# Patient Record
Sex: MALE | Race: WHITE | Employment: FULL TIME | URBAN - NONMETROPOLITAN AREA
[De-identification: names, ages, dates, MRNs, and addresses within clinical notes are randomized per-mention and may not be internally consistent; named-entity substitution may affect disease eponyms.]

---

## 2017-06-14 ENCOUNTER — OFFICE VISIT (OUTPATIENT)
Dept: PRIMARY CARE CLINIC | Age: 52
End: 2017-06-14
Payer: COMMERCIAL

## 2017-06-14 VITALS
BODY MASS INDEX: 29.4 KG/M2 | HEART RATE: 72 BPM | WEIGHT: 194 LBS | DIASTOLIC BLOOD PRESSURE: 72 MMHG | RESPIRATION RATE: 18 BRPM | SYSTOLIC BLOOD PRESSURE: 133 MMHG | TEMPERATURE: 97 F | HEIGHT: 68 IN

## 2017-06-14 DIAGNOSIS — E78.5 HYPERLIPIDEMIA, UNSPECIFIED HYPERLIPIDEMIA TYPE: ICD-10-CM

## 2017-06-14 DIAGNOSIS — I25.10 CORONARY ARTERY DISEASE INVOLVING NATIVE CORONARY ARTERY OF NATIVE HEART WITHOUT ANGINA PECTORIS: ICD-10-CM

## 2017-06-14 DIAGNOSIS — G44.52 NEW DAILY PERSISTENT HEADACHE: Primary | ICD-10-CM

## 2017-06-14 PROCEDURE — 99213 OFFICE O/P EST LOW 20 MIN: CPT | Performed by: FAMILY MEDICINE

## 2017-06-19 ASSESSMENT — ENCOUNTER SYMPTOMS
COLOR CHANGE: 0
NAUSEA: 0
DIARRHEA: 0
ABDOMINAL PAIN: 0
VOMITING: 0
RHINORRHEA: 0
CONSTIPATION: 0
COUGH: 0

## 2017-08-07 ENCOUNTER — OFFICE VISIT (OUTPATIENT)
Dept: PRIMARY CARE CLINIC | Age: 52
End: 2017-08-07
Payer: COMMERCIAL

## 2017-08-07 VITALS
HEIGHT: 68 IN | WEIGHT: 193 LBS | TEMPERATURE: 96.8 F | DIASTOLIC BLOOD PRESSURE: 80 MMHG | RESPIRATION RATE: 16 BRPM | SYSTOLIC BLOOD PRESSURE: 130 MMHG | BODY MASS INDEX: 29.25 KG/M2 | HEART RATE: 79 BPM

## 2017-08-07 DIAGNOSIS — M54.42 ACUTE BILATERAL LOW BACK PAIN WITH BILATERAL SCIATICA: ICD-10-CM

## 2017-08-07 DIAGNOSIS — Z12.11 SCREENING FOR COLON CANCER: ICD-10-CM

## 2017-08-07 DIAGNOSIS — E78.5 HYPERLIPIDEMIA, UNSPECIFIED HYPERLIPIDEMIA TYPE: ICD-10-CM

## 2017-08-07 DIAGNOSIS — M54.41 ACUTE BILATERAL LOW BACK PAIN WITH BILATERAL SCIATICA: ICD-10-CM

## 2017-08-07 DIAGNOSIS — R42 DIZZY: Primary | ICD-10-CM

## 2017-08-07 DIAGNOSIS — Z86.79 HISTORY OF ATHEROSCLEROTIC CARDIOVASCULAR DISEASE: ICD-10-CM

## 2017-08-07 PROCEDURE — 36415 COLL VENOUS BLD VENIPUNCTURE: CPT | Performed by: NURSE PRACTITIONER

## 2017-08-07 PROCEDURE — 93000 ELECTROCARDIOGRAM COMPLETE: CPT | Performed by: NURSE PRACTITIONER

## 2017-08-07 PROCEDURE — 99214 OFFICE O/P EST MOD 30 MIN: CPT | Performed by: NURSE PRACTITIONER

## 2017-08-07 PROCEDURE — 96372 THER/PROPH/DIAG INJ SC/IM: CPT | Performed by: NURSE PRACTITIONER

## 2017-08-07 RX ORDER — PREDNISONE 10 MG/1
TABLET ORAL
Qty: 18 TABLET | Refills: 0 | Status: ON HOLD | OUTPATIENT
Start: 2017-08-07 | End: 2017-12-04 | Stop reason: ALTCHOICE

## 2017-08-07 RX ORDER — METOPROLOL SUCCINATE 50 MG
TABLET, EXTENDED RELEASE 24 HR ORAL
COMMUNITY
Start: 2017-07-26 | End: 2018-03-29 | Stop reason: CLARIF

## 2017-08-07 RX ORDER — CYCLOBENZAPRINE HCL 10 MG
10 TABLET ORAL 3 TIMES DAILY PRN
Qty: 30 TABLET | Refills: 0 | Status: SHIPPED | OUTPATIENT
Start: 2017-08-07 | End: 2017-08-17

## 2017-08-07 RX ORDER — TRIAMCINOLONE ACETONIDE 40 MG/ML
40 INJECTION, SUSPENSION INTRA-ARTICULAR; INTRAMUSCULAR ONCE
Status: COMPLETED | OUTPATIENT
Start: 2017-08-07 | End: 2017-08-07

## 2017-08-07 RX ORDER — KETOROLAC TROMETHAMINE 10 MG/1
10 TABLET, FILM COATED ORAL EVERY 6 HOURS PRN
Qty: 20 TABLET | Refills: 0 | Status: ON HOLD | OUTPATIENT
Start: 2017-08-07 | End: 2017-12-04 | Stop reason: ALTCHOICE

## 2017-08-07 RX ORDER — ATORVASTATIN CALCIUM 80 MG/1
TABLET, FILM COATED ORAL
COMMUNITY
Start: 2017-05-16 | End: 2020-08-20

## 2017-08-07 RX ADMIN — TRIAMCINOLONE ACETONIDE 40 MG: 40 INJECTION, SUSPENSION INTRA-ARTICULAR; INTRAMUSCULAR at 17:26

## 2017-08-07 ASSESSMENT — ENCOUNTER SYMPTOMS
RESPIRATORY NEGATIVE: 1
GASTROINTESTINAL NEGATIVE: 1
BACK PAIN: 1
EYES NEGATIVE: 1

## 2017-08-08 LAB
ALBUMIN SERPL-MCNC: 4.7 G/DL (ref 3.5–5.2)
ALP BLD-CCNC: 169 U/L (ref 40–130)
ALT SERPL-CCNC: 52 U/L (ref 5–41)
ANION GAP SERPL CALCULATED.3IONS-SCNC: 13 MMOL/L (ref 7–19)
AST SERPL-CCNC: 28 U/L (ref 5–40)
BILIRUB SERPL-MCNC: 1.4 MG/DL (ref 0.2–1.2)
BUN BLDV-MCNC: 11 MG/DL (ref 6–20)
CALCIUM SERPL-MCNC: 9.7 MG/DL (ref 8.6–10)
CHLORIDE BLD-SCNC: 99 MMOL/L (ref 98–111)
CO2: 27 MMOL/L (ref 22–29)
CREAT SERPL-MCNC: 0.8 MG/DL (ref 0.5–1.2)
GFR NON-AFRICAN AMERICAN: >60
GLUCOSE BLD-MCNC: 125 MG/DL (ref 74–109)
HCT VFR BLD CALC: 46.2 % (ref 42–52)
HEMOGLOBIN: 15.8 G/DL (ref 14–18)
MCH RBC QN AUTO: 29.7 PG (ref 27–31)
MCHC RBC AUTO-ENTMCNC: 34.2 G/DL (ref 33–37)
MCV RBC AUTO: 86.8 FL (ref 80–94)
PDW BLD-RTO: 13.6 % (ref 11.5–14.5)
PLATELET # BLD: 211 K/UL (ref 130–400)
PMV BLD AUTO: 10.9 FL (ref 9.4–12.4)
POTASSIUM SERPL-SCNC: 4.4 MMOL/L (ref 3.5–5)
RBC # BLD: 5.32 M/UL (ref 4.7–6.1)
SODIUM BLD-SCNC: 139 MMOL/L (ref 136–145)
TOTAL CK: 45 U/L (ref 39–308)
TOTAL PROTEIN: 7.8 G/DL (ref 6.6–8.7)
WBC # BLD: 8.5 K/UL (ref 4.8–10.8)

## 2017-08-09 ENCOUNTER — HOSPITAL ENCOUNTER (OUTPATIENT)
Dept: VASCULAR LAB | Age: 52
Discharge: HOME | End: 2017-08-09
Payer: COMMERCIAL

## 2017-08-09 DIAGNOSIS — R42 DIZZY: Primary | ICD-10-CM

## 2017-08-09 PROCEDURE — 93880 EXTRACRANIAL BILAT STUDY: CPT

## 2017-08-16 ENCOUNTER — OFFICE VISIT (OUTPATIENT)
Dept: GASTROENTEROLOGY | Age: 52
End: 2017-08-16
Payer: COMMERCIAL

## 2017-08-16 VITALS
OXYGEN SATURATION: 98 % | HEIGHT: 68 IN | WEIGHT: 188 LBS | BODY MASS INDEX: 28.49 KG/M2 | RESPIRATION RATE: 18 BRPM | DIASTOLIC BLOOD PRESSURE: 80 MMHG | HEART RATE: 85 BPM | SYSTOLIC BLOOD PRESSURE: 105 MMHG

## 2017-08-16 DIAGNOSIS — Z83.71 FAMILY HISTORY OF COLONIC POLYPS: ICD-10-CM

## 2017-08-16 DIAGNOSIS — Z12.11 ENCOUNTER FOR SCREENING COLONOSCOPY: Primary | ICD-10-CM

## 2017-08-16 DIAGNOSIS — R12 CHRONIC HEARTBURN: ICD-10-CM

## 2017-08-16 DIAGNOSIS — Z80.0 FAMILY HISTORY OF COLON CANCER: ICD-10-CM

## 2017-08-16 PROCEDURE — 99214 OFFICE O/P EST MOD 30 MIN: CPT | Performed by: NURSE PRACTITIONER

## 2017-08-16 ASSESSMENT — ENCOUNTER SYMPTOMS
NAUSEA: 0
DIARRHEA: 0
ABDOMINAL DISTENTION: 0
CHEST TIGHTNESS: 0
BACK PAIN: 0
CONSTIPATION: 0
VOMITING: 0
SHORTNESS OF BREATH: 0
ABDOMINAL PAIN: 0
VOICE CHANGE: 0
RECTAL PAIN: 0
COUGH: 0
SORE THROAT: 0
BLOOD IN STOOL: 0

## 2017-08-22 ENCOUNTER — TELEPHONE (OUTPATIENT)
Dept: GASTROENTEROLOGY | Age: 52
End: 2017-08-22

## 2017-12-04 ENCOUNTER — ANESTHESIA (OUTPATIENT)
Dept: ENDOSCOPY | Age: 52
End: 2017-12-04
Payer: COMMERCIAL

## 2017-12-04 ENCOUNTER — ANESTHESIA EVENT (OUTPATIENT)
Dept: ENDOSCOPY | Age: 52
End: 2017-12-04
Payer: COMMERCIAL

## 2017-12-04 ENCOUNTER — HOSPITAL ENCOUNTER (OUTPATIENT)
Age: 52
Setting detail: OUTPATIENT SURGERY
Discharge: HOME | End: 2017-12-04
Attending: INTERNAL MEDICINE | Admitting: INTERNAL MEDICINE
Payer: COMMERCIAL

## 2017-12-04 VITALS
HEIGHT: 68 IN | OXYGEN SATURATION: 97 % | DIASTOLIC BLOOD PRESSURE: 81 MMHG | RESPIRATION RATE: 14 BRPM | SYSTOLIC BLOOD PRESSURE: 115 MMHG | WEIGHT: 185 LBS | BODY MASS INDEX: 28.04 KG/M2 | HEART RATE: 72 BPM | TEMPERATURE: 97.1 F

## 2017-12-04 VITALS
RESPIRATION RATE: 13 BRPM | OXYGEN SATURATION: 95 % | DIASTOLIC BLOOD PRESSURE: 67 MMHG | SYSTOLIC BLOOD PRESSURE: 106 MMHG

## 2017-12-04 PROCEDURE — 43239 EGD BIOPSY SINGLE/MULTIPLE: CPT | Performed by: INTERNAL MEDICINE

## 2017-12-04 PROCEDURE — 7100000011 HC PHASE II RECOVERY - ADDTL 15 MIN: Performed by: INTERNAL MEDICINE

## 2017-12-04 PROCEDURE — 7100000010 HC PHASE II RECOVERY - FIRST 15 MIN: Performed by: INTERNAL MEDICINE

## 2017-12-04 PROCEDURE — 88305 TISSUE EXAM BY PATHOLOGIST: CPT

## 2017-12-04 PROCEDURE — 6360000002 HC RX W HCPCS: Performed by: NURSE ANESTHETIST, CERTIFIED REGISTERED

## 2017-12-04 PROCEDURE — 3609010300 HC COLONOSCOPY W/BIOPSY SINGLE/MULTIPLE: Performed by: INTERNAL MEDICINE

## 2017-12-04 PROCEDURE — 3700000000 HC ANESTHESIA ATTENDED CARE: Performed by: INTERNAL MEDICINE

## 2017-12-04 PROCEDURE — 2580000003 HC RX 258: Performed by: INTERNAL MEDICINE

## 2017-12-04 PROCEDURE — G0105 COLORECTAL SCRN; HI RISK IND: HCPCS | Performed by: INTERNAL MEDICINE

## 2017-12-04 PROCEDURE — 2500000003 HC RX 250 WO HCPCS: Performed by: NURSE ANESTHETIST, CERTIFIED REGISTERED

## 2017-12-04 PROCEDURE — 3609012400 HC EGD TRANSORAL BIOPSY SINGLE/MULTIPLE: Performed by: INTERNAL MEDICINE

## 2017-12-04 PROCEDURE — 3700000001 HC ADD 15 MINUTES (ANESTHESIA): Performed by: INTERNAL MEDICINE

## 2017-12-04 RX ORDER — LIDOCAINE HYDROCHLORIDE 10 MG/ML
1 INJECTION, SOLUTION EPIDURAL; INFILTRATION; INTRACAUDAL; PERINEURAL ONCE
Status: DISCONTINUED | OUTPATIENT
Start: 2017-12-04 | End: 2017-12-04 | Stop reason: HOSPADM

## 2017-12-04 RX ORDER — PROPOFOL 10 MG/ML
INJECTION, EMULSION INTRAVENOUS PRN
Status: DISCONTINUED | OUTPATIENT
Start: 2017-12-04 | End: 2017-12-04 | Stop reason: SDUPTHER

## 2017-12-04 RX ORDER — LIDOCAINE HYDROCHLORIDE 10 MG/ML
INJECTION, SOLUTION INFILTRATION; PERINEURAL PRN
Status: DISCONTINUED | OUTPATIENT
Start: 2017-12-04 | End: 2017-12-04 | Stop reason: SDUPTHER

## 2017-12-04 RX ORDER — MIDAZOLAM HYDROCHLORIDE 1 MG/ML
INJECTION INTRAMUSCULAR; INTRAVENOUS PRN
Status: DISCONTINUED | OUTPATIENT
Start: 2017-12-04 | End: 2017-12-04 | Stop reason: SDUPTHER

## 2017-12-04 RX ORDER — SODIUM CHLORIDE, SODIUM LACTATE, POTASSIUM CHLORIDE, CALCIUM CHLORIDE 600; 310; 30; 20 MG/100ML; MG/100ML; MG/100ML; MG/100ML
INJECTION, SOLUTION INTRAVENOUS CONTINUOUS
Status: DISCONTINUED | OUTPATIENT
Start: 2017-12-04 | End: 2017-12-04 | Stop reason: HOSPADM

## 2017-12-04 RX ORDER — FENTANYL CITRATE 50 UG/ML
INJECTION, SOLUTION INTRAMUSCULAR; INTRAVENOUS PRN
Status: DISCONTINUED | OUTPATIENT
Start: 2017-12-04 | End: 2017-12-04 | Stop reason: SDUPTHER

## 2017-12-04 RX ADMIN — SODIUM CHLORIDE, POTASSIUM CHLORIDE, SODIUM LACTATE AND CALCIUM CHLORIDE: 600; 310; 30; 20 INJECTION, SOLUTION INTRAVENOUS at 12:06

## 2017-12-04 RX ADMIN — MIDAZOLAM HYDROCHLORIDE 2 MG: 1 INJECTION, SOLUTION INTRAMUSCULAR; INTRAVENOUS at 12:51

## 2017-12-04 RX ADMIN — FENTANYL CITRATE 50 MCG: 50 INJECTION, SOLUTION INTRAMUSCULAR; INTRAVENOUS at 12:51

## 2017-12-04 RX ADMIN — PROPOFOL 260 MG: 10 INJECTION, EMULSION INTRAVENOUS at 12:53

## 2017-12-04 RX ADMIN — LIDOCAINE HYDROCHLORIDE 50 MG: 10 INJECTION, SOLUTION INFILTRATION; PERINEURAL at 12:53

## 2017-12-04 NOTE — ANESTHESIA PRE PROCEDURE
Department of Anesthesiology  Preprocedure Note       Name:  Alphonse Vance   Age:  46 y.o.  :  1965                                          MRN:  [id number]         Date:  2017      Surgeon: Zulma Garcia):  Angle Weeks MD    Procedure: Procedure(s):  COLONOSCOPY DIAGNOSTIC OR SCREENING  EGD BIOPSY    Medications prior to admission:   Prior to Admission medications    Medication Sig Start Date End Date Taking? Authorizing Provider   atorvastatin (LIPITOR) 80 MG tablet  17   Historical Provider, MD   TOPROL XL 50 MG extended release tablet  17   Historical Provider, MD   metoprolol (LOPRESSOR) 25 MG tablet  3/2/16   Historical Provider, MD   ASPIRIN PO Take 81 mg by mouth daily     Historical Provider, MD   Esomeprazole Magnesium (NEXIUM PO) Take  by mouth.       Historical Provider, MD       Current medications:    Current Facility-Administered Medications   Medication Dose Route Frequency Provider Last Rate Last Dose    lactated ringers infusion   Intravenous Continuous Angle Weeks  mL/hr at 17 1206      lidocaine PF 1 % injection 1 mL  1 mL Intradermal Once Angle Weeks MD           Allergies:  No Known Allergies    Problem List:    Patient Active Problem List   Diagnosis Code    CAD (coronary artery disease) I25.10    Chest pain R07.9    Pacemaker Z95.0    Hyperlipidemia E78.5    Syncope R55    GERD (gastroesophageal reflux disease) K21.9    History of atherosclerotic cardiovascular disease Z86.79       Past Medical History:        Diagnosis Date    CAD (coronary artery disease)     Chest pain     GERD (gastroesophageal reflux disease)     History of atherosclerotic cardiovascular disease     Hyperlipidemia     Pacemaker     Syncope        Past Surgical History:        Procedure Laterality Date    CORONARY ANGIOPLASTY WITH STENT PLACEMENT      PACEMAKER PLACEMENT         Social History:    Social History   Substance Use Topics    Smoking status: Never Smoker

## 2017-12-04 NOTE — OP NOTE
Endoscopic Procedure Note    Patient: Whitley Dean : 1965  Med Rec#: [id number] Acc#: [id number]     Primary Care Provider Jael Nath CNP  Referring Provider: Erica PERES    Endoscopist: Lynnette Patton MD    Date of Procedure:  2017    Procedure:   1. EGD with biopsy    Indications:   1. Reflux, dyspepsia      Anesthesia:  Sedation was administered by anesthesia who monitored the patient during the procedure. Estimated Blood Loss: minimal    Procedure:   After reviewing the patient's chart and obtaining informed consent, the patient was placed in the left lateral decubitus position. A forward-viewing Olympus endoscope was lubricated and inserted through the mouth into the oropharynx. Under direct visualization, the upper esophagus was intubated. The scope was advanced to the level of the third portion of duodenum. Scope was slowly withdrawn with careful inspection of the mucosal surfaces. The scope was retroflexed for inspection of the gastric fundus and incisura. Findings and maneuvers are listed in impression below. The patient tolerated the procedure well. The scope was removed. There were no immediate complications. Findings:   Esophagus: normal    Stomach:  abnormal: mild mucosal changes suggestive of gastritis noted -  Gastric biopsies were taken from the antrum and body to rule out Helicobacter pylori infection. Duodenum: normal      IMPRESSION:  1. Gastritis. Biopsied     RECOMMENDATIONS:    1. Await path results, the patient will be contacted in 7-10 days with biopsy results. 2.  Continue current treatment. 3.  NSAID avoidance    The results were discussed with the patient and family. A copy of the images obtained were given to the patient.      Chapito Manzanares MD  2017  1:35 PM

## 2018-03-29 ENCOUNTER — OFFICE VISIT (OUTPATIENT)
Dept: PRIMARY CARE CLINIC | Age: 53
End: 2018-03-29
Payer: COMMERCIAL

## 2018-03-29 VITALS
TEMPERATURE: 97.5 F | OXYGEN SATURATION: 98 % | SYSTOLIC BLOOD PRESSURE: 132 MMHG | HEIGHT: 68 IN | HEART RATE: 65 BPM | DIASTOLIC BLOOD PRESSURE: 82 MMHG | BODY MASS INDEX: 28.79 KG/M2 | RESPIRATION RATE: 18 BRPM | WEIGHT: 190 LBS

## 2018-03-29 DIAGNOSIS — M54.2 NECK PAIN ON LEFT SIDE: Primary | ICD-10-CM

## 2018-03-29 DIAGNOSIS — M54.12 CERVICAL RADICULOPATHY: ICD-10-CM

## 2018-03-29 PROCEDURE — 99213 OFFICE O/P EST LOW 20 MIN: CPT | Performed by: NURSE PRACTITIONER

## 2018-03-29 PROCEDURE — 96372 THER/PROPH/DIAG INJ SC/IM: CPT | Performed by: NURSE PRACTITIONER

## 2018-03-29 RX ORDER — PREDNISONE 10 MG/1
TABLET ORAL
Qty: 18 TABLET | Refills: 0 | Status: SHIPPED | OUTPATIENT
Start: 2018-03-29 | End: 2021-03-25 | Stop reason: ALTCHOICE

## 2018-03-29 RX ORDER — CYCLOBENZAPRINE HCL 10 MG
10 TABLET ORAL 3 TIMES DAILY PRN
Qty: 30 TABLET | Refills: 0 | Status: SHIPPED | OUTPATIENT
Start: 2018-03-29 | End: 2018-04-08

## 2018-03-29 RX ORDER — TRIAMCINOLONE ACETONIDE 40 MG/ML
40 INJECTION, SUSPENSION INTRA-ARTICULAR; INTRAMUSCULAR ONCE
Status: COMPLETED | OUTPATIENT
Start: 2018-03-29 | End: 2018-03-29

## 2018-03-29 RX ADMIN — TRIAMCINOLONE ACETONIDE 40 MG: 40 INJECTION, SUSPENSION INTRA-ARTICULAR; INTRAMUSCULAR at 17:02

## 2018-03-29 ASSESSMENT — PATIENT HEALTH QUESTIONNAIRE - PHQ9
2. FEELING DOWN, DEPRESSED OR HOPELESS: 0
SUM OF ALL RESPONSES TO PHQ QUESTIONS 1-9: 0
SUM OF ALL RESPONSES TO PHQ9 QUESTIONS 1 & 2: 0
1. LITTLE INTEREST OR PLEASURE IN DOING THINGS: 0

## 2018-03-29 NOTE — PATIENT INSTRUCTIONS
2017  Content Version: 11.5  © 5262-3365 Healthwise, Incorporated. Care instructions adapted under license by [facility] ([facility]). If you have questions about a medical condition or this instruction, always ask your healthcare professional. [address] any warranty or liability for your use of this information.

## 2018-03-29 NOTE — PROGRESS NOTES
take 1 PO BID, days 7-9 take 1 PO daily. 18 tablet 0    cyclobenzaprine (FLEXERIL) 10 MG tablet Take 1 tablet by mouth 3 times daily as needed for Muscle spasms While at home 30 tablet 0    atorvastatin (LIPITOR) 80 MG tablet       metoprolol (LOPRESSOR) 25 MG tablet   4    ASPIRIN PO Take 81 mg by mouth daily       Esomeprazole Magnesium (NEXIUM PO) Take  by mouth. No current facility-administered medications for this visit. No Known Allergies    Health Maintenance   Topic Date Due    Hepatitis C screen  1965    HIV screen  06/14/1980    DTaP/Tdap/Td vaccine (1 - Tdap) 06/14/1984    Lipid screen  06/14/2005    Diabetes screen  06/14/2005    Shingles Vaccine (1 of 2 - 2 Dose Series) 06/14/2015    Flu vaccine (1) 09/01/2017    Colon cancer screen colonoscopy  12/04/2022       Subjective:      Review of Systems   Constitutional: Negative. Musculoskeletal: Positive for neck pain. Left arm   Neurological: Positive for numbness (numbness in left 4th 5th finger). Objective:     Physical Exam   Constitutional: He is oriented to person, place, and time. He appears well-developed and well-nourished. HENT:   Head: Normocephalic. Cardiovascular: Normal rate, regular rhythm and normal heart sounds. Pulmonary/Chest: Effort normal and breath sounds normal.       Musculoskeletal:        Cervical back: He exhibits decreased range of motion, tenderness and pain. He exhibits no bony tenderness, no swelling, no edema, no deformity, no laceration, no spasm and normal pulse. Left  slightly weaker than  right   Neurological: He is alert and oriented to person, place, and time. Skin: Skin is warm and dry. Psychiatric: He has a normal mood and affect. His behavior is normal. Judgment and thought content normal.   Nursing note and vitals reviewed.     /82   Pulse 65   Temp 97.5 °F (36.4 °C)   Resp 18   Ht 5' 8\" (1.727 m)   Wt 190 lb (86.2 kg)   SpO2 98%   BMI 28.89 kg/m²     Assessment:      1. Neck pain on left side     2. Cervical radiculopathy         Plan:        Patient given educational materials - see patient instructions. Discussed use, benefit, and side effects of prescribed medications. All patient questions answered. Pt voiced understanding. Reviewed health maintenance. Instructed to continue current medications, diet and exercise. Patient agreed with treatment plan. Follow up as directed. MEDICATIONS:  Orders Placed This Encounter   Medications    predniSONE (DELTASONE) 10 MG tablet     Sig: Days 1-3 take 1 PO TID, days 4-6 take 1 PO BID, days 7-9 take 1 PO daily. Dispense:  18 tablet     Refill:  0    cyclobenzaprine (FLEXERIL) 10 MG tablet     Sig: Take 1 tablet by mouth 3 times daily as needed for Muscle spasms While at home     Dispense:  30 tablet     Refill:  0    triamcinolone acetonide (KENALOG-40) injection 40 mg         ORDERS:  No orders of the defined types were placed in this encounter. Follow-up:  No Follow-up on file. PATIENT INSTRUCTIONS:  Patient Instructions       Patient Education      neck exercises  Start the steroids tomorrow  Flexeril when at home  Warm moist heat  May need PT for 2 wks  Then probably will need Ct due to pacer  Neck Pain: Care Instructions  Your Care Instructions    You can have neck pain anywhere from the bottom of your head to the top of your shoulders. It can spread to the upper back or arms. Injuries, painting a ceiling, sleeping with your neck twisted, staying in one position for too long, and many other activities can cause neck pain. Most neck pain gets better with home care. Your doctor may recommend medicine to relieve pain or relax your muscles. He or she may suggest exercise and physical therapy to increase flexibility and relieve stress. You may need to wear a special (cervical) collar to support your neck for a day or two. Follow-up care is a key part of your treatment and safety.  Be sure to make and go to all appointments, and call your doctor if you are having problems. It's also a good idea to know your test results and keep a list of the medicines you take. How can you care for yourself at home? · Try using a heating pad on a low or medium setting for 15 to 20 minutes every 2 or 3 hours. Try a warm shower in place of one session with the heating pad. · You can also try an ice pack for 10 to 15 minutes every 2 to 3 hours. Put a thin cloth between the ice and your skin. · Take pain medicines exactly as directed. ¨ If the doctor gave you a prescription medicine for pain, take it as prescribed. ¨ If you are not taking a prescription pain medicine, ask your doctor if you can take an over-the-counter medicine. · If your doctor recommends a cervical collar, wear it exactly as directed. When should you call for help? Call your doctor now or seek immediate medical care if:  ? · You have new or worsening numbness in your arms, buttocks or legs. ? · You have new or worsening weakness in your arms or legs. (This could make it hard to stand up.)   ? · You lose control of your bladder or bowels. ? Watch closely for changes in your health, and be sure to contact your doctor if:  ? · Your neck pain is getting worse. ? · You are not getting better after 1 week. ? · You do not get better as expected. Where can you learn more? Go to https://Web and [employer]. org and sign in to your [employer] account. Enter [phone] in the [facility] box to learn more about \"Neck Pain: Care Instructions. \"     If you do not have an account, please click on the \"Sign Up Now\" link. Current as of: March 21, 2017  Content Version: 11.5  © 6655-9101 Healthwise, Incorporated. Care instructions adapted under license by [facility] ([facility]).  If you have questions about a medical condition or this instruction, always ask your healthcare professional. Fahad Pace disclaims any warranty or liability

## 2018-11-21 ENCOUNTER — HOSPITAL ENCOUNTER (OUTPATIENT)
Dept: CARDIAC REHAB | Age: 53
Setting detail: THERAPIES SERIES
Discharge: HOME | End: 2018-11-21
Payer: COMMERCIAL

## 2018-11-21 PROCEDURE — 93798 PHYS/QHP OP CAR RHAB W/ECG: CPT

## 2018-11-26 ENCOUNTER — HOSPITAL ENCOUNTER (OUTPATIENT)
Dept: CARDIAC REHAB | Age: 53
Setting detail: THERAPIES SERIES
Discharge: HOME | End: 2018-11-26
Payer: COMMERCIAL

## 2018-11-26 PROCEDURE — 93798 PHYS/QHP OP CAR RHAB W/ECG: CPT

## 2018-11-28 ENCOUNTER — HOSPITAL ENCOUNTER (OUTPATIENT)
Dept: CARDIAC REHAB | Age: 53
Setting detail: THERAPIES SERIES
Discharge: HOME | End: 2018-11-28
Payer: COMMERCIAL

## 2018-11-28 PROCEDURE — 93798 PHYS/QHP OP CAR RHAB W/ECG: CPT

## 2018-11-28 RX ORDER — ATORVASTATIN CALCIUM 40 MG/1
40 TABLET, FILM COATED ORAL NIGHTLY
COMMUNITY

## 2018-11-28 RX ORDER — CLOPIDOGREL BISULFATE 75 MG/1
75 TABLET ORAL DAILY
COMMUNITY
End: 2018-11-29

## 2018-11-28 RX ORDER — ASPIRIN 81 MG/1
81 TABLET ORAL DAILY
COMMUNITY

## 2018-11-29 ENCOUNTER — OFFICE VISIT (OUTPATIENT)
Dept: CARDIOLOGY | Facility: CLINIC | Age: 53
End: 2018-11-29

## 2018-11-29 VITALS
BODY MASS INDEX: 28.34 KG/M2 | WEIGHT: 187 LBS | OXYGEN SATURATION: 99 % | SYSTOLIC BLOOD PRESSURE: 130 MMHG | HEART RATE: 73 BPM | HEIGHT: 68 IN | DIASTOLIC BLOOD PRESSURE: 80 MMHG

## 2018-11-29 DIAGNOSIS — I25.10 ATHEROSCLEROSIS OF NATIVE CORONARY ARTERY OF NATIVE HEART WITHOUT ANGINA PECTORIS: Primary | ICD-10-CM

## 2018-11-29 DIAGNOSIS — I49.5 SICK SINUS SYNDROME (HCC): ICD-10-CM

## 2018-11-29 DIAGNOSIS — E78.2 MIXED HYPERLIPIDEMIA: ICD-10-CM

## 2018-11-29 DIAGNOSIS — E11.9 TYPE 2 DIABETES MELLITUS WITHOUT COMPLICATION, WITHOUT LONG-TERM CURRENT USE OF INSULIN (HCC): ICD-10-CM

## 2018-11-29 PROBLEM — E78.5 HYPERLIPIDEMIA: Status: ACTIVE | Noted: 2018-11-29

## 2018-11-29 PROCEDURE — 93000 ELECTROCARDIOGRAM COMPLETE: CPT | Performed by: INTERNAL MEDICINE

## 2018-11-29 PROCEDURE — 99204 OFFICE O/P NEW MOD 45 MIN: CPT | Performed by: INTERNAL MEDICINE

## 2018-11-29 RX ORDER — NITROGLYCERIN 0.4 MG/1
0.4 TABLET SUBLINGUAL AS NEEDED
Refills: 2 | COMMUNITY
Start: 2018-11-18

## 2018-11-29 NOTE — PROGRESS NOTES
Subjective:     Encounter Date:11/29/2018      Patient ID: Fred Atkins is a 53 y.o. male with coronary artery disease, status post previous PCI to LAD and RCA and more recently a PCI to the RCA (while in [place]), pacemaker in place (by Dr. Cam),  Referred here to establish cardiac care locally.    Referring Provider: Ac Aldrich MD    Reason for Referral: Establish care - CAD    Chief Complaint: Establish care    Coronary Artery Disease   Presents for initial visit. The disease course has been stable. Pertinent negatives include no chest pain, chest pressure, chest tightness, dizziness, leg swelling, palpitations or shortness of breath. Risk factors include diabetes and hyperlipidemia. Past treatments include aspirin, antiplatelet agents, statins and beta blockers. The treatment provided significant relief. Compliance with prior treatments has been good. His past medical history is significant for past myocardial infarction. There is no history of angina pectoris or valvular heart disease. Past surgical history includes cardiac stents.      This is a 53-year-old male with a history of coronary artery disease, status post multiple interventions to the right coronary artery along with the LAD, more recently PCI to the right coronary artery while in [place], previous history of sinus node dysfunction and a pacemaker who is referred here to establish care.  The patient says that since his recent PCI last month, he has been chest pain-free.  Prior to this, he had not been expressing any chest discomfort but did experience some which led him to the outside hospital where he underwent cardiac catheterization and PCI.  More recently, also, the patient has been diagnosed with diabetes in place on metformin.  He has tolerated this well so far.  The patient has not had any problems with his dual antiplatelet therapy.  The patient says that he remains physically active and has not had any significant  limitations to activities.  He denies any significant shortness of breath, dyspnea on exertion and denies orthopnea, PND, edema, lightheadedness, dizziness, syncope.  His blood pressure has been well-controlled.    The following portions of the patient's history were reviewed and updated as appropriate: allergies, current medications, past family history, past medical history, past social history, past surgical history and problem list.     Past Medical History:   Diagnosis Date   • Atherosclerotic heart disease    • Diabetes mellitus (CMS/HCC)    • Hyperlipidemia    • Myocardial infarction (CMS/HCC)    • Sick sinus syndrome (CMS/HCC)      Past Surgical History:   Procedure Laterality Date   • PACEMAKER IMPLANTATION         Current Outpatient Medications:   •  aspirin 81 MG EC tablet, Take 81 mg by mouth Daily., Disp: , Rfl:   •  atorvastatin (LIPITOR) 40 MG tablet, Take 80 mg by mouth Every Night., Disp: , Rfl:   •  metFORMIN (GLUCOPHAGE) 500 MG tablet, Take 500 mg by mouth 2 (Two) Times a Day With Meals., Disp: , Rfl:   •  metoprolol tartrate (LOPRESSOR) 25 MG tablet, Take 25 mg by mouth 2 (Two) Times a Day., Disp: , Rfl:   •  nitroglycerin (NITROSTAT) 0.4 MG SL tablet, Place 0.4 mg under the tongue As Needed., Disp: , Rfl: 2  •  ticagrelor (BRILINTA) 90 MG tablet tablet, Take 90 mg by mouth 2 (Two) Times a Day., Disp: , Rfl:     No Known Allergies    Social History     Tobacco Use   • Smoking status: Never Smoker   • Smokeless tobacco: Never Used   Substance Use Topics   • Alcohol use: Yes     Comment: OCCASIONALLY     Family History   Problem Relation Age of Onset   • Heart disease Mother    • Heart disease Father    • Heart attack Father    • Cancer Sister         COLON   • Diabetes Brother    • Heart disease Brother      Review of Systems   Constitution: Negative for chills, fever, night sweats and weight loss.   HENT: Negative for congestion and hearing loss.    Eyes: Negative for blurred vision and pain.    Cardiovascular: Negative for chest pain, claudication, dyspnea on exertion, irregular heartbeat, leg swelling, orthopnea, palpitations, paroxysmal nocturnal dyspnea and syncope.   Respiratory: Negative for chest tightness, cough, hemoptysis, shortness of breath and wheezing.    Endocrine: Negative for cold intolerance, heat intolerance, polydipsia and polyuria.   Hematologic/Lymphatic: Negative for adenopathy and bleeding problem. Does not bruise/bleed easily.   Skin: Negative for color change, poor wound healing and rash.   Musculoskeletal: Negative for arthritis, back pain, joint pain, joint swelling, myalgias and neck pain.   Gastrointestinal: Negative for abdominal pain, change in bowel habit, constipation, diarrhea, heartburn, hematochezia, melena, nausea and vomiting.   Genitourinary: Negative for bladder incontinence, dysuria, frequency, hematuria and nocturia.   Neurological: Negative for dizziness, focal weakness, headaches, light-headedness, loss of balance, numbness and seizures.   Psychiatric/Behavioral: Negative for altered mental status, memory loss and substance abuse.   Allergic/Immunologic: Negative for hives and persistent infections.         ECG 12 Lead  Date/Time: 11/29/2018 11:52 AM  Performed by: Derek Baires MD  Authorized by: Derek Baires MD   Previous ECG: no previous ECG available  Rhythm: sinus bradycardia  BPM: 58  Conduction: conduction normal  QRS axis: normal  Other findings: PRWP  Clinical impression: abnormal ECG  Comments: TWI inferior               Objective:     Physical Exam   Constitutional: He is oriented to person, place, and time. Vital signs are normal. He appears well-developed and well-nourished. He is cooperative.  Non-toxic appearance. No distress.   HENT:   Head: Normocephalic and atraumatic.   Right Ear: External ear normal.   Left Ear: External ear normal.   Nose: Nose normal.   Mouth/Throat: Uvula is midline, oropharynx is clear and moist and  mucous membranes are normal. Mucous membranes are not pale, not dry and not cyanotic. No oropharyngeal exudate.   Eyes: EOM and lids are normal. Pupils are equal, round, and reactive to light.   Neck: Normal range of motion. Neck supple. No hepatojugular reflux and no JVD present. Carotid bruit is not present. No tracheal deviation and no edema present. No thyroid mass and no thyromegaly present.   Cardiovascular: Normal rate, regular rhythm, S1 normal, S2 normal, normal heart sounds, intact distal pulses and normal pulses.  No extrasystoles are present. PMI is not displaced. Exam reveals no gallop and no friction rub.   No murmur heard.  Pulses:       Radial pulses are 2+ on the right side, and 2+ on the left side.        Femoral pulses are 2+ on the right side, and 2+ on the left side.       Dorsalis pedis pulses are 2+ on the right side, and 2+ on the left side.        Posterior tibial pulses are 2+ on the right side, and 2+ on the left side.   Pulmonary/Chest: Effort normal and breath sounds normal. No accessory muscle usage. No respiratory distress. He has no wheezes. He has no rales. He exhibits no tenderness.   Abdominal: Soft. Normal appearance and bowel sounds are normal. He exhibits no distension, no abdominal bruit and no pulsatile midline mass. There is no hepatosplenomegaly. There is no tenderness.   Musculoskeletal: Normal range of motion. He exhibits no edema, tenderness or deformity.   Lymphadenopathy:     He has no cervical adenopathy.   Neurological: He is alert and oriented to person, place, and time. He has normal strength. No cranial nerve deficit.   Skin: Skin is warm, dry and intact. No rash noted. He is not diaphoretic. No cyanosis or erythema. Nails show no clubbing.   Psychiatric: He has a normal mood and affect. His speech is normal and behavior is normal. Thought content normal.   Vitals reviewed.    /80 (BP Location: Left arm, Patient Position: Sitting)   Pulse 73   Ht 172.7 cm  "(68\")   Wt 84.8 kg (187 lb)   SpO2 99%   BMI 28.43 kg/m²     Data/Lab Review:     Per Records from [place]:  \"Pleasant 54 yo man with CRF that include + FH of CAD and hyperlipidemia  admitted in 11/10 for ACS. Patient had ~ 6 month history of angina and  presented to [place] in 6/10 with chest pain and symptomatic bradycardia.  Found to have significant RCA stenosis that was stented (details not  available). Two weeks later had syncopal episode that by report was due  to bradycardia and PPM was implanted at that time. Did well until 11/10  when he had recurrent angina. Had missed two doses of plavix. No  elevation in cardiac enzymes. Coronary angiography revealed severe  in-stent restenosis which was treated with a JOZEF. Had distal RCA lesion  treated with Promus JOZEF. Normal LV systolic function.    The patient was admitted to [facility] in [place] in 4/15  with ACS (troponin normal). Coronary angiogram from 4/3/15 revealed  normal LM, 60% D1 (small), small ramus with 75-85% stenosis, mild  disease in LCX, dominant right with patent stents, 95% in the PL.  Underwent PCI with Xience stent. Returned to see me in 12/15 with  recurrent exertional chest pain. Underwent repeat angiography on 12/15  and found to have 90% pLAD, 99% mLAD and 90% PDA (all new lesions). Had  JOZEF deployed in proximal and mid LAD lesions, POBA of PL.\"        Assessment:          Diagnosis Plan   1. Atherosclerosis of native coronary artery of native heart without angina pectoris  ECG 12 Lead   2. Sick sinus syndrome (CMS/HCC)     3. Mixed hyperlipidemia     4. Type 2 diabetes mellitus without complication, without long-term current use of insulin (CMS/[facility])            Plan:       1.  Coronary artery disease: This patient is clinically stable at this time after his most recent PCI.  He remains on dual antiplatelet therapy.  He has not had any issues with this so far.  He does remain on beta blocker and statin therapies as well.  He " is instructed to remain physically active, try to adhere to a cardiac diet and regular exercise program as well as control his blood pressure, cholesterol, diabetes.  No changes are recommended to his medical therapy at this time.    2.  Sick sinus syndrome: The patient is a pacemaker in place.  This is appropriately functioning at this time.    3.  Mixed hyperlipidemia: The patient's lipids are followed by his primary care provider.  He does remain on statin therapy.  Should his LDL cholesterol not be at goal, especially with his recurrent cardiac events, he should be considered for something like Repatha to lower his LDL cholesterol and lower his future risk of cardiac events.    4.  Type II diabetes mellitus: The patient has been recently diagnosed with type II diabetes and has been placed on metformin.  He will follow-up with his primary care physician in the near future.  He is trying to adhere to a more proper diet, exercise more regularly and lose weight in order to try to get hold metformin.  If he is unable to do so and remains on metformin with a diagnosis of type II diabetes mellitus, he should be considered for a medication like Jardiance in the future to lower his overall cardiac risk in the setting of his diabetes.    Patient's Body mass index is 28.43 kg/m². BMI is above normal parameters. Recommendations include: exercise counseling and nutrition counseling.    Follow-up: 6 months unless needed sooner

## 2018-11-30 ENCOUNTER — HOSPITAL ENCOUNTER (OUTPATIENT)
Dept: CARDIAC REHAB | Age: 53
Setting detail: THERAPIES SERIES
Discharge: HOME | End: 2018-11-30
Payer: COMMERCIAL

## 2018-11-30 PROCEDURE — 93798 PHYS/QHP OP CAR RHAB W/ECG: CPT

## 2019-02-22 LAB
ALBUMIN SERPL-MCNC: 4.7 G/DL (ref 3.5–5.2)
ALP BLD-CCNC: 171 U/L (ref 40–130)
ALT SERPL-CCNC: 31 U/L (ref 5–41)
ANION GAP SERPL CALCULATED.3IONS-SCNC: 14 MMOL/L (ref 7–19)
AST SERPL-CCNC: 20 U/L (ref 5–40)
BASOPHILS ABSOLUTE: 0.1 K/UL (ref 0–0.2)
BASOPHILS RELATIVE PERCENT: 0.6 % (ref 0–1)
BILIRUB SERPL-MCNC: 2.4 MG/DL (ref 0.2–1.2)
BUN BLDV-MCNC: 15 MG/DL (ref 6–20)
CALCIUM SERPL-MCNC: 9.4 MG/DL (ref 8.6–10)
CHLORIDE BLD-SCNC: 101 MMOL/L (ref 98–111)
CHOLESTEROL, TOTAL: 131 MG/DL (ref 160–199)
CO2: 25 MMOL/L (ref 22–29)
CREAT SERPL-MCNC: 0.8 MG/DL (ref 0.5–1.2)
EOSINOPHILS ABSOLUTE: 0.2 K/UL (ref 0–0.6)
EOSINOPHILS RELATIVE PERCENT: 2.3 % (ref 0–5)
GFR NON-AFRICAN AMERICAN: >60
GLUCOSE BLD-MCNC: 135 MG/DL (ref 74–109)
HBA1C MFR BLD: 6.5 % (ref 4–6)
HCT VFR BLD CALC: 46.2 % (ref 42–52)
HDLC SERPL-MCNC: 35 MG/DL (ref 55–121)
HEMOGLOBIN: 15.3 G/DL (ref 14–18)
LDL CHOLESTEROL CALCULATED: 45 MG/DL
LYMPHOCYTES ABSOLUTE: 1.1 K/UL (ref 1.1–4.5)
LYMPHOCYTES RELATIVE PERCENT: 13.9 % (ref 20–40)
MCH RBC QN AUTO: 27.8 PG (ref 27–31)
MCHC RBC AUTO-ENTMCNC: 33.1 G/DL (ref 33–37)
MCV RBC AUTO: 84 FL (ref 80–94)
MICROALBUMIN UR-MCNC: <1.2 MG/DL (ref 0–19)
MONOCYTES ABSOLUTE: 0.5 K/UL (ref 0–0.9)
MONOCYTES RELATIVE PERCENT: 6.6 % (ref 0–10)
NEUTROPHILS ABSOLUTE: 6 K/UL (ref 1.5–7.5)
NEUTROPHILS RELATIVE PERCENT: 76.3 % (ref 50–65)
PDW BLD-RTO: 13.7 % (ref 11.5–14.5)
PLATELET # BLD: 219 K/UL (ref 130–400)
PMV BLD AUTO: 10 FL (ref 9.4–12.4)
POTASSIUM SERPL-SCNC: 4.4 MMOL/L (ref 3.5–5)
RBC # BLD: 5.5 M/UL (ref 4.7–6.1)
SODIUM BLD-SCNC: 140 MMOL/L (ref 136–145)
TOTAL PROTEIN: 7.4 G/DL (ref 6.6–8.7)
TRIGL SERPL-MCNC: 253 MG/DL (ref 0–149)
WBC # BLD: 7.9 K/UL (ref 4.8–10.8)

## 2019-02-27 ENCOUNTER — HOSPITAL ENCOUNTER (OUTPATIENT)
Dept: ULTRASOUND IMAGING | Age: 54
Discharge: HOME | End: 2019-02-27
Payer: COMMERCIAL

## 2019-02-27 DIAGNOSIS — R10.11 ABDOMINAL PAIN, RIGHT UPPER QUADRANT: ICD-10-CM

## 2019-02-27 PROCEDURE — 76705 ECHO EXAM OF ABDOMEN: CPT

## 2019-06-06 ENCOUNTER — CLINICAL SUPPORT NO REQUIREMENTS (OUTPATIENT)
Dept: CARDIOLOGY | Facility: CLINIC | Age: 54
End: 2019-06-06

## 2019-06-06 ENCOUNTER — OFFICE VISIT (OUTPATIENT)
Dept: CARDIOLOGY | Facility: CLINIC | Age: 54
End: 2019-06-06

## 2019-06-06 VITALS
OXYGEN SATURATION: 99 % | WEIGHT: 184 LBS | HEART RATE: 60 BPM | HEIGHT: 68 IN | DIASTOLIC BLOOD PRESSURE: 76 MMHG | BODY MASS INDEX: 27.89 KG/M2 | SYSTOLIC BLOOD PRESSURE: 110 MMHG

## 2019-06-06 DIAGNOSIS — I49.5 SICK SINUS SYNDROME (HCC): ICD-10-CM

## 2019-06-06 DIAGNOSIS — E78.2 MIXED HYPERLIPIDEMIA: ICD-10-CM

## 2019-06-06 DIAGNOSIS — I25.10 ATHEROSCLEROSIS OF NATIVE CORONARY ARTERY OF NATIVE HEART WITHOUT ANGINA PECTORIS: Primary | ICD-10-CM

## 2019-06-06 DIAGNOSIS — Z95.0 PACEMAKER: Primary | ICD-10-CM

## 2019-06-06 PROCEDURE — 93288 INTERROG EVL PM/LDLS PM IP: CPT | Performed by: INTERNAL MEDICINE

## 2019-06-06 PROCEDURE — 93000 ELECTROCARDIOGRAM COMPLETE: CPT | Performed by: INTERNAL MEDICINE

## 2019-06-06 PROCEDURE — 99214 OFFICE O/P EST MOD 30 MIN: CPT | Performed by: INTERNAL MEDICINE

## 2019-06-06 RX ORDER — OMEPRAZOLE 20 MG/1
20 CAPSULE, DELAYED RELEASE ORAL DAILY PRN
COMMUNITY

## 2019-06-06 NOTE — PROGRESS NOTES
Dual Chamber Pacemaker Evaluation Report  IN OFFICE INTERROGATION    June 6, 2019    Primary Cardiologist: Viry  : Medtronic Model: Adapta ADDR01  Implant date: 7/30/2010    Reason for evaluation: provider requested, new patient establishing care with [facility]  Indication for pacemaker: sick sinus syndrome    Measurements  Atrial sensing - P wave: 0.7- >2.8 mV  Atrial threshold: 0.75V@ 0.4ms  Atrial lead impedance: 669 ohms  Ventricular sensing - R wave: 5.6-16 mV  Ventricular threshold: 1.125 V @ 0.4 ms  Ventricular lead impedance:   613 ohms     Diagnostic Data  Atrial paced: 20.1 %  Ventricular paced: 0.2 %    Episodes recorded since 6/8/2018:  6 mode switches  3 SVT episodes, longest duration 4 seconds, rates 180-197 bpm.  Patient reports being asymptomatic.    Battery status: satisfactory   2.78V, estimated 4.5 years remaining      Final Parameters  Mode:  AAIR+  Lower rate: 50 bpm   Upper rate: 130 bpm  AV Delay: paced- 150 ms  Sensed-120 ms  Atrial - Amplitude: 1.5 V   Pulse width: 0.4 ms   Sensitivity: 0.5 mV     Ventricular - Amplitude: 2.25 V  Pulse width: 0.4 ms  Sensitivity: 2.8 mV    Changes made: Dr. Baires's information added to device.  Conclusions: normal pacemaker function, stable pacing and sensing thresholds and adequate battery reserve    Follow up: 6 months via carelink, annually in office (6/11/2020)     Note:  Following MD updated and new ID card ordered for patient from [employer].

## 2019-06-06 NOTE — PROGRESS NOTES
Subjective:     Encounter Date:06/06/2019      Patient ID: Fred Atkins is a 53 y.o. male with coronary artery disease, status post previous PCI to LAD and RCA and more recently a PCI to the RCA (while in [place]), pacemaker in place (by Dr. Cam), here for follow-up.    Chief Complaint: Follow-up    Coronary Artery Disease   Presents for follow-up visit. Pertinent negatives include no chest pain, dizziness, leg swelling, palpitations, shortness of breath or weight gain. The symptoms have been stable. Compliance with diet is variable. Compliance with exercise is variable. Compliance with medications is good.      This patient presents for routine follow-up.  He says that he has been doing very well.  No recurrent chest discomfort.  No significant shortness of breath or dyspnea on exertion.  He says that occasionally he will have some what he describes as strange episodes where he feels somewhat weak and may be even a little lightheaded.  He does wonder whether or not his blood pressure may be low.  Previously, he thought this might be due to low blood sugar but says that he has checked his blood sugar and it is okay during these episodes.  He says that these have been intermittent if not rare over a period of about 20 years with no increase lately.  He denies orthopnea, PND, edema.  No syncopal episodes.  No problems with medications.  He reports good control of his blood pressure and cholesterol.      Current Outpatient Medications:   •  aspirin 81 MG EC tablet, Take 81 mg by mouth Daily., Disp: , Rfl:   •  atorvastatin (LIPITOR) 40 MG tablet, Take 40 mg by mouth Every Night., Disp: , Rfl:   •  metFORMIN (GLUCOPHAGE) 500 MG tablet, Take 500 mg by mouth 2 (Two) Times a Day With Meals., Disp: , Rfl:   •  metoprolol tartrate (LOPRESSOR) 25 MG tablet, Take 25 mg by mouth Daily., Disp: , Rfl:   •  nitroglycerin (NITROSTAT) 0.4 MG SL tablet, Place 0.4 mg under the tongue As Needed., Disp: , Rfl: 2  •  omeprazole  (priLOSEC) 20 MG capsule, Take 20 mg by mouth Daily As Needed., Disp: , Rfl:   •  ticagrelor (BRILINTA) 90 MG tablet tablet, Take 90 mg by mouth 2 (Two) Times a Day., Disp: , Rfl:     No Known Allergies    Social History     Tobacco Use   • Smoking status: Never Smoker   • Smokeless tobacco: Never Used   Substance Use Topics   • Alcohol use: Yes     Comment: OCCASIONALLY     Review of Systems   Constitution: Negative for weakness, weight gain and weight loss.   Cardiovascular: Negative for chest pain, claudication, dyspnea on exertion, irregular heartbeat, leg swelling, orthopnea, palpitations, paroxysmal nocturnal dyspnea and syncope.   Respiratory: Negative for cough, hemoptysis, shortness of breath and wheezing.    Endocrine: Negative for cold intolerance and heat intolerance.   Hematologic/Lymphatic: Negative for bleeding problem. Does not bruise/bleed easily.   Gastrointestinal: Negative for abdominal pain, hematemesis, hematochezia, melena, nausea and vomiting.   Neurological: Negative for dizziness, headaches and loss of balance.       ECG 12 Lead  Date/Time: 6/6/2019 11:35 AM  Performed by: Derek Baires MD  Authorized by: Derek Baires MD   Comparison: compared with previous ECG from 11/29/2018  Similar to previous ECG  Rhythm: sinus bradycardia  Rate: bradycardic  BPM: 58  Conduction: conduction normal  ST Segments: ST segments normal  T inversion: III and aVF  QRS axis: normal    Clinical impression: non-specific ECG               Objective:     Physical Exam   Constitutional: He is oriented to person, place, and time. He appears well-developed and well-nourished. No distress.   HENT:   Head: Normocephalic and atraumatic.   Mouth/Throat: Oropharynx is clear and moist.   Eyes: EOM are normal. Pupils are equal, round, and reactive to light.   Neck: Normal range of motion. Neck supple. No JVD present. No thyromegaly present.   Cardiovascular: Normal rate, regular rhythm, S1 normal, S2  "normal, normal heart sounds and intact distal pulses. Exam reveals no gallop and no friction rub.   No murmur heard.  Pulmonary/Chest: Effort normal and breath sounds normal.   Abdominal: Soft. Bowel sounds are normal. He exhibits no distension. There is no tenderness.   Musculoskeletal: Normal range of motion. He exhibits no edema.   Neurological: He is alert and oriented to person, place, and time. No cranial nerve deficit.   Skin: Skin is warm and dry. No rash noted. No cyanosis or erythema. Nails show no clubbing.   Psychiatric: He has a normal mood and affect.   Vitals reviewed.    /76 (BP Location: Left arm, Patient Position: Sitting)   Pulse 60   Ht 172.7 cm (68\")   Wt 83.5 kg (184 lb)   SpO2 99%   BMI 27.98 kg/m²     Data/Lab Review:     Per Records from [place]:  \"Pleasant 52 yo man with CRF that include + FH of CAD and hyperlipidemia  admitted in 11/10 for ACS. Patient had ~ 6 month history of angina and  presented to [place] in 6/10 with chest pain and symptomatic bradycardia.  Found to have significant RCA stenosis that was stented (details not  available). Two weeks later had syncopal episode that by report was due  to bradycardia and PPM was implanted at that time. Did well until 11/10  when he had recurrent angina. Had missed two doses of plavix. No  elevation in cardiac enzymes. Coronary angiography revealed severe  in-stent restenosis which was treated with a JOZEF. Had distal RCA lesion  treated with Promus JOZEF. Normal LV systolic function.    The patient was admitted to [facility] in [place] in 4/15  with ACS (troponin normal). Coronary angiogram from 4/3/15 revealed  normal LM, 60% D1 (small), small ramus with 75-85% stenosis, mild  disease in LCX, dominant right with patent stents, 95% in the PL.  Underwent PCI with Xience stent. Returned to see me in 12/15 with  recurrent exertional chest pain. Underwent repeat angiography on 12/15  and found to have 90% pLAD, 99% mLAD and " "90% PDA (all new lesions). Had  JOZEF deployed in proximal and mid LAD lesions, POBA of PL.\"        Assessment:          Diagnosis Plan   1. Atherosclerosis of native coronary artery of native heart without angina pectoris  ECG 12 Lead   2. Sick sinus syndrome (CMS/HCC)     3. Mixed hyperlipidemia          Plan:       1.  Coronary artery disease: The patient remains stable at this time with no ischemic symptoms.  He remains on dual antiplatelet therapy.  His last stent was within the past 12 months therefore he knows to continue dual antiplatelet therapy for at least 12 months.  In addition, he remains on beta-blocker and statin therapies per     2.  Sick sinus syndrome: Stable at this time.  He will be transferring his pacemaker interrogations to our clinic.     3.  Mixed hyperlipidemia: Lipids are followed by Dr. Aldrich and the patient reports good control on his current dose of Lipitor.  He is not having any side effects from this medication.     Patient's Body mass index is 27.98 kg/m². BMI is above normal parameters. Recommendations include: exercise counseling and nutrition counseling.     Follow-up: 6 months unless needed sooner               "

## 2019-06-19 ENCOUNTER — APPOINTMENT (OUTPATIENT)
Dept: GENERAL RADIOLOGY | Age: 54
End: 2019-06-19
Payer: COMMERCIAL

## 2019-06-19 ENCOUNTER — HOSPITAL ENCOUNTER (EMERGENCY)
Age: 54
Discharge: HOME | End: 2019-06-19
Payer: COMMERCIAL

## 2019-06-19 VITALS
RESPIRATION RATE: 17 BRPM | HEIGHT: 68 IN | BODY MASS INDEX: 28.19 KG/M2 | SYSTOLIC BLOOD PRESSURE: 126 MMHG | WEIGHT: 186 LBS | TEMPERATURE: 98.1 F | OXYGEN SATURATION: 96 % | HEART RATE: 78 BPM | DIASTOLIC BLOOD PRESSURE: 79 MMHG

## 2019-06-19 DIAGNOSIS — S81.802A WOUND OF LEFT LOWER EXTREMITY, INITIAL ENCOUNTER: Primary | ICD-10-CM

## 2019-06-19 LAB
ALBUMIN SERPL-MCNC: 4.8 G/DL (ref 3.5–5.2)
ALP BLD-CCNC: 172 U/L (ref 40–130)
ALT SERPL-CCNC: 25 U/L (ref 5–41)
ANION GAP SERPL CALCULATED.3IONS-SCNC: 14 MMOL/L (ref 7–19)
AST SERPL-CCNC: 19 U/L (ref 5–40)
BASOPHILS ABSOLUTE: 0.1 K/UL (ref 0–0.2)
BASOPHILS RELATIVE PERCENT: 0.8 % (ref 0–1)
BILIRUB SERPL-MCNC: 2.4 MG/DL (ref 0.2–1.2)
BUN BLDV-MCNC: 13 MG/DL (ref 6–20)
CALCIUM SERPL-MCNC: 9.4 MG/DL (ref 8.6–10)
CHLORIDE BLD-SCNC: 100 MMOL/L (ref 98–111)
CO2: 25 MMOL/L (ref 22–29)
CREAT SERPL-MCNC: 0.6 MG/DL (ref 0.5–1.2)
EOSINOPHILS ABSOLUTE: 0.3 K/UL (ref 0–0.6)
EOSINOPHILS RELATIVE PERCENT: 3.2 % (ref 0–5)
GFR NON-AFRICAN AMERICAN: >60
GLUCOSE BLD-MCNC: 153 MG/DL (ref 74–109)
HCT VFR BLD CALC: 41.8 % (ref 42–52)
HEMOGLOBIN: 14.3 G/DL (ref 14–18)
LACTIC ACID: 1.7 MMOL/L (ref 0.5–1.9)
LYMPHOCYTES ABSOLUTE: 1.5 K/UL (ref 1.1–4.5)
LYMPHOCYTES RELATIVE PERCENT: 16.6 % (ref 20–40)
MCH RBC QN AUTO: 29.1 PG (ref 27–31)
MCHC RBC AUTO-ENTMCNC: 34.2 G/DL (ref 33–37)
MCV RBC AUTO: 85 FL (ref 80–94)
MONOCYTES ABSOLUTE: 0.5 K/UL (ref 0–0.9)
MONOCYTES RELATIVE PERCENT: 5.8 % (ref 0–10)
NEUTROPHILS ABSOLUTE: 6.6 K/UL (ref 1.5–7.5)
NEUTROPHILS RELATIVE PERCENT: 73.4 % (ref 50–65)
PDW BLD-RTO: 13.5 % (ref 11.5–14.5)
PLATELET # BLD: 203 K/UL (ref 130–400)
PMV BLD AUTO: 10.2 FL (ref 9.4–12.4)
POTASSIUM REFLEX MAGNESIUM: 4.1 MMOL/L (ref 3.5–5)
RBC # BLD: 4.92 M/UL (ref 4.7–6.1)
SODIUM BLD-SCNC: 139 MMOL/L (ref 136–145)
TOTAL PROTEIN: 7.3 G/DL (ref 6.6–8.7)
WBC # BLD: 8.9 K/UL (ref 4.8–10.8)

## 2019-06-19 PROCEDURE — 87070 CULTURE OTHR SPECIMN AEROBIC: CPT

## 2019-06-19 PROCEDURE — 90471 IMMUNIZATION ADMIN: CPT | Performed by: NURSE PRACTITIONER

## 2019-06-19 PROCEDURE — 87205 SMEAR GRAM STAIN: CPT

## 2019-06-19 PROCEDURE — 36415 COLL VENOUS BLD VENIPUNCTURE: CPT

## 2019-06-19 PROCEDURE — 85025 COMPLETE CBC W/AUTO DIFF WBC: CPT

## 2019-06-19 PROCEDURE — 73590 X-RAY EXAM OF LOWER LEG: CPT

## 2019-06-19 PROCEDURE — 99283 EMERGENCY DEPT VISIT LOW MDM: CPT | Performed by: NURSE PRACTITIONER

## 2019-06-19 PROCEDURE — 87040 BLOOD CULTURE FOR BACTERIA: CPT

## 2019-06-19 PROCEDURE — 73610 X-RAY EXAM OF ANKLE: CPT

## 2019-06-19 PROCEDURE — 6370000000 HC RX 637 (ALT 250 FOR IP): Performed by: NURSE PRACTITIONER

## 2019-06-19 PROCEDURE — 2580000003 HC RX 258: Performed by: NURSE PRACTITIONER

## 2019-06-19 PROCEDURE — 96374 THER/PROPH/DIAG INJ IV PUSH: CPT

## 2019-06-19 PROCEDURE — 83605 ASSAY OF LACTIC ACID: CPT

## 2019-06-19 PROCEDURE — 80053 COMPREHEN METABOLIC PANEL: CPT

## 2019-06-19 PROCEDURE — 90715 TDAP VACCINE 7 YRS/> IM: CPT | Performed by: NURSE PRACTITIONER

## 2019-06-19 PROCEDURE — 99283 EMERGENCY DEPT VISIT LOW MDM: CPT

## 2019-06-19 PROCEDURE — 6360000002 HC RX W HCPCS: Performed by: NURSE PRACTITIONER

## 2019-06-19 RX ORDER — CLINDAMYCIN HYDROCHLORIDE 300 MG/1
300 CAPSULE ORAL 4 TIMES DAILY
Qty: 40 CAPSULE | Refills: 0 | Status: SHIPPED | OUTPATIENT
Start: 2019-06-19 | End: 2019-06-29

## 2019-06-19 RX ORDER — DOXYCYCLINE HYCLATE 100 MG
100 TABLET ORAL 2 TIMES DAILY
Qty: 20 TABLET | Refills: 0 | Status: SHIPPED | OUTPATIENT
Start: 2019-06-19 | End: 2019-06-29

## 2019-06-19 RX ORDER — DOXYCYCLINE HYCLATE 100 MG/1
100 CAPSULE ORAL ONCE
Status: COMPLETED | OUTPATIENT
Start: 2019-06-19 | End: 2019-06-19

## 2019-06-19 RX ADMIN — TETANUS TOXOID, REDUCED DIPHTHERIA TOXOID AND ACELLULAR PERTUSSIS VACCINE, ADSORBED 0.5 ML: 5; 2.5; 8; 8; 2.5 SUSPENSION INTRAMUSCULAR at 22:38

## 2019-06-19 RX ADMIN — CEFTRIAXONE SODIUM 1 G: 1 INJECTION, POWDER, FOR SOLUTION INTRAMUSCULAR; INTRAVENOUS at 22:40

## 2019-06-19 RX ADMIN — DOXYCYCLINE HYCLATE 100 MG: 100 CAPSULE ORAL at 22:40

## 2019-06-19 ASSESSMENT — ENCOUNTER SYMPTOMS
NAUSEA: 0
VOMITING: 0

## 2019-06-20 NOTE — ED NOTES
PT presents to ED with abrasion on left leg after slipping off dock in [place] on Saturday.       Prashant Garcia RN  06/19/19 [id number]

## 2019-06-20 NOTE — ED PROVIDER NOTES
[facility] EMERGENCY DEPT  eMERGENCYdEPARTMENT eNCOUnter      Pt Name: Adi Shine  MRN: [id number]  [place] 1965  Date of evaluation: 6/19/2019  Provider:JA Maza    CHIEF COMPLAINT       Chief Complaint   Patient presents with    Wound Check     left leg         HISTORY OF PRESENT ILLNESS  (Location/Symptom, Timing/Onset, Context/Setting, Quality, Duration, Modifying Factors, Severity.)   Adi Shine is a 47 y.o. male who presents to the emergency department with chief complaint of a large open wound noted to the left anterior shin. Patient reports on Friday he was out of town [place] and he sustained an injury. He reports he was stepping off of a boat and scraped his left shin on the dock. He reports it was just a superficial abrasion that has progressed into a crusty wound. He reports swelling noted to the left leg and left foot. He reports the swelling and bruising is a new finding. He denies any fevers, chills, rigors, nausea, vomiting or diarrhea. He is a diabetic type II. The history is provided by the patient. Nursing Notes were reviewed and I agree. REVIEW OF SYSTEMS    (2-9 systems for level 4, 10 or more for level 5)     Review of Systems   Constitutional: Negative for activity change, appetite change, chills and fever. Gastrointestinal: Negative for nausea and vomiting. Skin: Positive for wound (Left shin). All other systems reviewed and are negative. Except as noted above the remainder of the review of systems was reviewed and negative.        PAST MEDICAL HISTORY     Past Medical History:   Diagnosis Date    CAD (coronary artery disease)     Chest pain     GERD (gastroesophageal reflux disease)     History of atherosclerotic cardiovascular disease     Hyperlipidemia     Pacemaker     Syncope          SURGICAL HISTORY       Past Surgical History:   Procedure Laterality Date    CORONARY ANGIOPLASTY WITH STENT PLACEMENT      PACEMAKER service: None     Active member of club or organization: None     Attends meetings of clubs or organizations: None     Relationship status: None    Intimate partner violence:     Fear of current or ex partner: None     Emotionally abused: None     Physically abused: None     Forced sexual activity: None   Other Topics Concern    None   Social History Narrative    None       SCREENINGS           PHYSICAL EXAM    (up to 7 forlevel 4, 8 or more for level 5)     ED Triage Vitals [06/19/19 1944]   BP Temp Temp Source Pulse Resp SpO2 Height Weight   126/79 98.1 °F (36.7 °C) Temporal 78 17 96 % 5' 8\" (1.727 m) 186 lb (84.4 kg)       Physical Exam   Constitutional: He is oriented to person, place, and time. He appears well-developed and well-nourished. No distress. HENT:   Head: Normocephalic. Right Ear: External ear normal. No drainage. Tympanic membrane is not erythematous. Left Ear: External ear normal. No drainage. Tympanic membrane is not erythematous. Eyes: Pupils are equal, round, and reactive to light. Conjunctivae and EOM are normal.   Neck: Normal range of motion. Cardiovascular: Normal rate, regular rhythm and normal heart sounds. No murmur heard. Pulmonary/Chest: Effort normal and breath sounds normal. No respiratory distress. He has no wheezes. He has no rales. Abdominal: Soft. There is no tenderness. Musculoskeletal: Normal range of motion. He exhibits no edema. Legs:  Lymphadenopathy:     He has no cervical adenopathy. Neurological: He is alert and oriented to person, place, and time. Skin: Skin is warm and dry. Capillary refill takes less than 2 seconds. No rash noted. He is not diaphoretic. No erythema. No pallor. Psychiatric: He has a normal mood and affect. Nursing note and vitals reviewed.         DIAGNOSTIC RESULTS     RADIOLOGY:   Non-plain film images such as CT, Ultrasound and MRI are read by the radiologist. Plain radiographic images are visualized and preliminarilyinterpreted by No att. providers found with the below findings:        Interpretation per the Radiologist below, if available at the time of this note:    XR ANKLE LEFT (MIN 3 VIEWS)   Final Result   No acute osseous findings. Signed by Dr Rainer Linares on 6/19/2019 10:46 PM      XR TIBIA FIBULA LEFT (2 VIEWS)   Final Result   Very mild diffuse soft tissue swelling. Intact left tibia and fibula. Signed by Dr Rainer Linares on 6/19/2019 10:48 PM          LABS:  Labs Reviewed   CBC WITH AUTO DIFFERENTIAL - Abnormal; Notable for the following components:       Result Value    Hematocrit 41.8 (*)     Neutrophils % 73.4 (*)     Lymphocytes % 16.6 (*)     All other components within normal limits   COMPREHENSIVE METABOLIC PANEL W/ REFLEX TO MG FOR LOW K - Abnormal; Notable for the following components:    Glucose 153 (*)     Total Bilirubin 2.4 (*)     Alkaline Phosphatase 172 (*)     All other components within normal limits   CULTURE BLOOD #1   CULTURE BLOOD #2   WOUND CULTURE    Narrative:     ORDER#: [id number]                          ORDERED BY: Jv Bee  SOURCE: Leg                                COLLECTED:  06/19/19 21:20  ANTIBIOTICS AT [facility].:                      RECEIVED :  06/19/19 21:27   LACTIC ACID, PLASMA       All other labs were within normal range or notreturned as of this dictation. RE-ASSESSMENT          EMERGENCY DEPARTMENT COURSE and DIFFERENTIAL DIAGNOSIS/MDM:   Vitals:    Vitals:    06/19/19 1944   BP: 126/79   Pulse: 78   Resp: 17   Temp: 98.1 °F (36.7 °C)   TempSrc: Temporal   SpO2: 96%   Weight: 186 lb (84.4 kg)   Height: 5' 8\" (1.727 m)           MDM  Number of Diagnoses or Management Options  Wound of left lower extremity, initial encounter:   Diagnosis management comments: Patient presents to the ED with a wound noted to his left lower extremity.   His laboratory studies are unremarkable tinnitus is white count and lactate are normal.  He is a diabetic and he sustained this

## 2019-06-21 ENCOUNTER — OFFICE VISIT (OUTPATIENT)
Dept: WOUND CARE | Facility: HOSPITAL | Age: 54
End: 2019-06-21

## 2019-06-21 ENCOUNTER — TRANSCRIBE ORDERS (OUTPATIENT)
Dept: ADMINISTRATIVE | Facility: HOSPITAL | Age: 54
End: 2019-06-21

## 2019-06-21 ENCOUNTER — HOSPITAL ENCOUNTER (OUTPATIENT)
Dept: ULTRASOUND IMAGING | Facility: HOSPITAL | Age: 54
Discharge: HOME | End: 2019-06-21
Admitting: NURSE PRACTITIONER

## 2019-06-21 DIAGNOSIS — S81.802D OPEN WOUND OF LEFT LOWER LEG, SUBSEQUENT ENCOUNTER: Primary | ICD-10-CM

## 2019-06-21 PROCEDURE — G0463 HOSPITAL OUTPT CLINIC VISIT: HCPCS

## 2019-06-21 PROCEDURE — 93971 EXTREMITY STUDY: CPT | Performed by: SURGERY

## 2019-06-21 PROCEDURE — 93971 EXTREMITY STUDY: CPT

## 2019-06-23 LAB
GRAM STAIN RESULT: NORMAL
WOUND/ABSCESS: NORMAL

## 2019-06-25 LAB
BLOOD CULTURE, ROUTINE: NORMAL
CULTURE, BLOOD 2: NORMAL

## 2019-06-28 ENCOUNTER — OFFICE VISIT (OUTPATIENT)
Dept: WOUND CARE | Facility: HOSPITAL | Age: 54
End: 2019-06-28

## 2020-05-06 ENCOUNTER — OFFICE VISIT (OUTPATIENT)
Dept: PRIMARY CARE CLINIC | Age: 55
End: 2020-05-06
Payer: COMMERCIAL

## 2020-05-06 VITALS — OXYGEN SATURATION: 98 % | TEMPERATURE: 97 F | HEART RATE: 54 BPM

## 2020-05-06 PROCEDURE — 99213 OFFICE O/P EST LOW 20 MIN: CPT | Performed by: NURSE PRACTITIONER

## 2020-05-06 ASSESSMENT — ENCOUNTER SYMPTOMS
SINUS PRESSURE: 1
DIARRHEA: 0
RHINORRHEA: 0
CONSTIPATION: 0
NAUSEA: 0
COUGH: 0
ABDOMINAL PAIN: 0
SHORTNESS OF BREATH: 0
TROUBLE SWALLOWING: 0
VOMITING: 0
SORE THROAT: 0

## 2020-05-06 NOTE — PROGRESS NOTES
[address] [facility]  [address] [facility] [id number]  Dept: [phone]  Dept Fax: [phone]: [phone]      Young Guido is c/o of Concern For COVID-19 (pt was exposed to lab confirmed Covid pt. ) and Sinus Problem        HPI:     Patient complains of exposure to covid. He was on a boat with confirmed COVID case. He has had some mild sinus symptoms. Symptoms have been unchanged with time. He denies any other symptoms . Relevant PMH: DM and CAD. Smoking history:  He  reports that he has never smoked. He has never used smokeless tobacco.     He was exposed to covid confirmed case over the weekend. Treatment to date: none. Recent travel or possible COVID exposure:yes    Past Medical History:   Diagnosis Date    CAD (coronary artery disease)     Chest pain     GERD (gastroesophageal reflux disease)     History of atherosclerotic cardiovascular disease     Hyperlipidemia     Pacemaker     Syncope       Current Outpatient Medications   Medication Sig Dispense Refill    ticagrelor (BRILINTA) 90 MG TABS tablet Brilinta 90 mg tablet   TAKE 1 TABLET BY MOUTH EVERY DAY      metFORMIN (GLUCOPHAGE) 500 MG tablet metformin 500 mg tablet   Take 1 tablet twice a day by oral route.  predniSONE (DELTASONE) 10 MG tablet Days 1-3 take 1 PO TID, days 4-6 take 1 PO BID, days 7-9 take 1 PO daily. 18 tablet 0    atorvastatin (LIPITOR) 80 MG tablet       metoprolol (LOPRESSOR) 25 MG tablet   4    ASPIRIN PO Take 81 mg by mouth daily       Esomeprazole Magnesium (NEXIUM PO) Take  by mouth. No current facility-administered medications for this visit.       No Known Allergies    Health Maintenance   Topic Date Due    Hepatitis C screen  1965    Diabetic foot exam  06/14/1975    Diabetic retinal exam  06/14/1975    HIV screen  06/14/1980    Shingles Vaccine (1 of 2) 06/14/2015    A1C test (Diabetic or Prediabetic) 02/22/2020    Diabetic microalbuminuria test  02/22/2020    Lipid screen  02/22/2020    Flu vaccine (Season Ended) 09/01/2020    Colon cancer screen colonoscopy  12/04/2022    DTaP/Tdap/Td vaccine (2 - Td) 06/19/2029    Hepatitis A vaccine  Aged Out    Hepatitis B vaccine  Aged Out    Hib vaccine  Aged Out    Meningococcal (ACWY) vaccine  Aged Out    Pneumococcal 0-64 years Vaccine  Aged Out       Subjective:      Review of Systems   Constitutional: Negative for activity change, appetite change, fatigue, fever and unexpected weight change. HENT: Positive for congestion and sinus pressure. Negative for ear pain, rhinorrhea, sore throat and trouble swallowing. Eyes: Negative for visual disturbance. Respiratory: Negative for cough and shortness of breath. Cardiovascular: Negative for chest pain, palpitations and leg swelling. Gastrointestinal: Negative for abdominal pain, constipation, diarrhea, nausea and vomiting. Genitourinary: Negative for flank pain. Musculoskeletal: Negative for arthralgias, myalgias, neck pain and neck stiffness. Neurological: Negative for headaches. Psychiatric/Behavioral: Negative for decreased concentration and sleep disturbance. The patient is not nervous/anxious. Objective:     Physical Exam  Constitutional:       Appearance: He is well-developed. HENT:      Head: Normocephalic and atraumatic. Right Ear: Tympanic membrane, ear canal and external ear normal.      Left Ear: Tympanic membrane, ear canal and external ear normal.      Nose: Nose normal.      Mouth/Throat:      Mouth: Mucous membranes are moist.      Pharynx: Oropharynx is clear. Eyes:      Conjunctiva/sclera: Conjunctivae normal.      Pupils: Pupils are equal, round, and reactive to light. Neck:      Musculoskeletal: Normal range of motion and neck supple. Cardiovascular:      Rate and Rhythm: Regular rhythm. Bradycardia present. Heart sounds: Normal heart sounds.

## 2020-05-06 NOTE — PATIENT INSTRUCTIONS
before eating or preparing food. If soap and water are not readily available, use an alcohol-based hand  with at least 60% alcohol, covering all surfaces of your hands and rubbing them together until they feel dry. Soap and water are the best option if hands are visibly dirty. Avoid touching your eyes, nose, and mouth with unwashed hands. Avoid sharing personal household items  You should not share dishes, drinking glasses, cups, eating utensils, towels, or bedding with other people or pets in your home. After using these items, they should be washed thoroughly with soap and water. Clean all high-touch surfaces everyday  High touch surfaces include counters, tabletops, doorknobs, bathroom fixtures, toilets, phones, keyboards, tablets, and bedside tables. Also, clean any surfaces that may have blood, stool, or body fluids on them. Use a household cleaning spray or wipe, according to the label instructions. Labels contain instructions for safe and effective use of the cleaning product including precautions you should take when applying the product, such as wearing gloves and making sure you have good ventilation during use of the product. Monitor your symptoms  Seek prompt medical attention if your illness is worsening (e.g., difficulty breathing). Before seeking care, call your healthcare provider and tell them that you have, or are being evaluated for, COVID-19. Put on a facemask before you enter the facility. These steps will help the healthcare providers office to keep other people in the office or waiting room from getting infected or exposed. Ask your healthcare provider to call the local or state health department. Persons who are placed under active monitoring or facilitated self-monitoring should follow instructions provided by their local health department or occupational health professionals, as appropriate. When working with your local health department check their available hours.   If you other animals while you are sick with COVID-19, just like you would around other people. Although there have not been reports of pets or other animals becoming sick with COVID-19, it is still recommended that people sick with COVID-19 limit contact with animals until more information is known about the virus. When possible, have another member of your household care for your animals while you are sick. If you are sick with COVID-19, avoid contact with your pet, including petting, snuggling, being kissed or licked, and sharing food. If you must care for your pet or be around animals while you are sick, wash your hands before and after you interact with pets and wear a facemask. See COVID-19 and Animals for more information. Call ahead before visiting your doctor  If you have a medical appointment, call the healthcare provider and tell them that you have or may have COVID-19. This will help the healthcare providers office take steps to keep other people from getting infected or exposed. Wear a facemask  You should wear a facemask when you are around other people (e.g., sharing a room or vehicle) or pets and before you enter a healthcare providers office. If you are not able to wear a facemask (for example, because it causes trouble breathing), then people who live with you should not stay in the same room with you, or they should wear a facemask if they enter your room. Cover your coughs and sneezes  Cover your mouth and nose with a tissue when you cough or sneeze. Throw used tissues in a lined trash can. Immediately wash your hands with soap and water for at least 20 seconds or, if soap and water are not available, clean your hands with an alcohol-based hand  that contains at least 60% alcohol.     Clean your hands often  Wash your hands often with soap and water for at least 20 seconds, especially after blowing your nose, coughing, or sneezing; going to the bathroom; and before eating or preparing or are being evaluated for COVID-19. If possible, put on a facemask before emergency medical services arrive. Discontinuing home isolation  Patients with confirmed COVID-19 should remain under home isolation precautions until the risk of secondary transmission to others is thought to be low. The decision to discontinue home isolation precautions should be made on a case-by-case basis, in consultation with healthcare providers and state and local health departments. Recommended precautions for household members, intimate partners, and caregivers in a nonhealthcare setting a patient with symptomatic laboratory-confirmed COVID-19 or a patient under investigation (PUI)    Household members, intimate partners, and caregivers in a nonhealthcare setting may have close contact with a person with symptomatic, laboratory-confirmed COVID-19 or a person under investigation. Close contacts should monitor their health; they should call their healthcare provider right away if they develop symptoms suggestive of COVID-19 (e.g., fever, cough, shortness of breath) (see Interim [place] Guidance for Risk Assessment and Public Health Management of Persons with Potential Coronavirus Disease 2019 (COVID-19) Exposure in Travel-associated or [facility].)    Close contacts should also follow these recommendations:    -Make sure that you understand and can help the patient follow their healthcare providers instructions for medication(s) and care. You should help the patient with basic needs in the home and provide support for getting groceries, prescriptions, and other personal needs. -Monitor the patients symptoms. If the patient is getting sicker, call his or her healthcare provider and tell them that the patient has laboratory-confirmed COVID-19. This will help the healthcare providers office take steps to keep other people in the office or waiting room from getting infected.  Ask the healthcare provider to call the local or [facility] for additional guidance. If the patient has a medical emergency and you need to call 911, notify the dispatch personnel that the patient has, or is being evaluated for COVID-19.  -Household members should stay in another room or be  from the patient as much as possible. Household members should use a separate bedroom and bathroom, if available. -Prohibit visitors who do not have an essential need to be in the home.  -Household members should care for any pets in the home. Do not handle pets or other animals while sick. For more information, see COVID-19 and Animals. -Make sure that shared spaces in the home have good air flow, such as by an air conditioner or an opened window, weather permitting.  -Perform hand hygiene frequently. Wash your hands often with soap and water for at least 20 seconds or use an alcohol-based hand  that contains 60 to 95% alcohol, covering all surfaces of your hands and rubbing them together until they feel dry. Soap and water should be used preferentially if hands are visibly dirty.  -Avoid touching your eyes, nose, and mouth with unwashed hands.  -The patient should wear a facemask when you are around other people. If the patient is not able to wear a facemask (for example, because it causes trouble breathing), you, as the caregiver, should wear a mask when you are in the same room as the patient.  -Wear a disposable facemask and gloves when you touch or have contact with the patients blood, stool, or body fluids, such as saliva, sputum, nasal mucus, vomit, urine.  -Throw out disposable facemasks and gloves after using them. Do not reuse.  -When removing personal protective equipment, first remove and dispose of gloves. Then, immediately clean your hands with soap and water or alcohol-based hand .  Next, remove and dispose of facemask, and immediately clean your hands again with soap and water or alcohol-based hand .  -Avoid sharing household items with the patient. You should not share dishes, drinking glasses, cups, eating utensils, towels, bedding, or other items. After the patient uses these items, you should wash them thoroughly (see below [employer]). -Clean all high-touch surfaces, such as counters, tabletops, doorknobs, bathroom fixtures, toilets, phones, keyboards, tablets, and bedside tables, every day. Also, clean any surfaces that may have blood, stool, or body fluids on them. -Use a household cleaning spray or wipe, according to the label instructions. Labels contain instructions for safe and effective use of the cleaning product including precautions you should take when applying the product, such as wearing gloves and making sure you have good ventilation during use of the product.  -Wash laundry thoroughly.  -Immediately remove and wash clothes or bedding that have blood, stool, or body fluids on them.  -Wear disposable gloves while handling soiled items and keep soiled items away from your body. Clean your hands (with soap and water or an alcohol-based hand ) immediately after removing your gloves. -Read and follow directions on labels of laundry or clothing items and detergent. In general, using a normal laundry detergent according to washing machine instructions and dry thoroughly using the warmest temperatures recommended on the clothing label.  -Place all used disposable gloves, facemasks, and other contaminated items in a lined container before disposing of them with other household waste. Clean your hands (with soap and water or an alcohol-based hand ) immediately after handling these items. Soap and water should be used preferentially if hands are visibly dirty.  -Discuss any additional questions with your state or local health department or healthcare provider.       Footnotes  1) Home healthcare personnel should refer to Interim Infection Prevention and Control

## 2020-05-08 LAB
REPORT: NORMAL
SARS-COV-2: NOT DETECTED
THIS TEST SENT TO: NORMAL

## 2020-08-20 ENCOUNTER — PATIENT MESSAGE (OUTPATIENT)
Dept: PRIMARY CARE CLINIC | Age: 55
End: 2020-08-20

## 2020-08-20 RX ORDER — ATORVASTATIN CALCIUM 40 MG/1
TABLET, FILM COATED ORAL
COMMUNITY
Start: 2020-06-12 | End: 2021-09-27

## 2020-08-24 ENCOUNTER — OFFICE VISIT (OUTPATIENT)
Dept: PRIMARY CARE CLINIC | Age: 55
End: 2020-08-24
Payer: COMMERCIAL

## 2020-08-24 VITALS
OXYGEN SATURATION: 98 % | WEIGHT: 187.8 LBS | RESPIRATION RATE: 16 BRPM | BODY MASS INDEX: 28.55 KG/M2 | TEMPERATURE: 97.1 F | HEART RATE: 75 BPM | SYSTOLIC BLOOD PRESSURE: 124 MMHG | DIASTOLIC BLOOD PRESSURE: 84 MMHG

## 2020-08-24 LAB
ALBUMIN SERPL-MCNC: 5 G/DL (ref 3.5–5.2)
ALP BLD-CCNC: 194 U/L (ref 40–130)
ALT SERPL-CCNC: 42 U/L (ref 5–41)
ANION GAP SERPL CALCULATED.3IONS-SCNC: 14 MMOL/L (ref 7–19)
AST SERPL-CCNC: 23 U/L (ref 5–40)
BILIRUB SERPL-MCNC: 2 MG/DL (ref 0.2–1.2)
BUN BLDV-MCNC: 13 MG/DL (ref 6–20)
CALCIUM SERPL-MCNC: 10 MG/DL (ref 8.6–10)
CHLORIDE BLD-SCNC: 100 MMOL/L (ref 98–111)
CHOLESTEROL, TOTAL: 175 MG/DL (ref 160–199)
CO2: 26 MMOL/L (ref 22–29)
CREAT SERPL-MCNC: 0.6 MG/DL (ref 0.5–1.2)
GFR AFRICAN AMERICAN: >59
GFR NON-AFRICAN AMERICAN: >60
GLUCOSE BLD-MCNC: 147 MG/DL (ref 74–109)
HBA1C MFR BLD: 7.6 % (ref 4–6)
HCT VFR BLD CALC: 46.8 % (ref 42–52)
HDLC SERPL-MCNC: 37 MG/DL (ref 55–121)
HEMOGLOBIN: 15.8 G/DL (ref 14–18)
LDL CHOLESTEROL CALCULATED: ABNORMAL MG/DL
LDL CHOLESTEROL DIRECT: 79 MG/DL
MCH RBC QN AUTO: 28.8 PG (ref 27–31)
MCHC RBC AUTO-ENTMCNC: 33.8 G/DL (ref 33–37)
MCV RBC AUTO: 85.2 FL (ref 80–94)
PDW BLD-RTO: 13.7 % (ref 11.5–14.5)
PLATELET # BLD: 211 K/UL (ref 130–400)
PMV BLD AUTO: 10 FL (ref 9.4–12.4)
POTASSIUM SERPL-SCNC: 4.4 MMOL/L (ref 3.5–5)
PROSTATE SPECIFIC ANTIGEN: 3.65 NG/ML (ref 0–4)
RBC # BLD: 5.49 M/UL (ref 4.7–6.1)
SODIUM BLD-SCNC: 140 MMOL/L (ref 136–145)
TOTAL PROTEIN: 7.9 G/DL (ref 6.6–8.7)
TRIGL SERPL-MCNC: 517 MG/DL (ref 0–149)
WBC # BLD: 6.8 K/UL (ref 4.8–10.8)

## 2020-08-24 PROCEDURE — 99203 OFFICE O/P NEW LOW 30 MIN: CPT | Performed by: NURSE PRACTITIONER

## 2020-08-24 ASSESSMENT — ENCOUNTER SYMPTOMS
RESPIRATORY NEGATIVE: 1
ALLERGIC/IMMUNOLOGIC NEGATIVE: 1
EYES NEGATIVE: 1
GASTROINTESTINAL NEGATIVE: 1

## 2020-08-24 NOTE — PROGRESS NOTES
[facility] PHYSICIAN SERVICES  [facility]  [address]  [address] [address]  Dept: [phone]  Dept Fax: [phone]  Loc: [phone]    Antwan Stratton is a 54 y.o. male who presents today for his medical conditions/complaints as noted below. Antwan Stratton is c/o of New Patient (history of heart issues, pt is fasting, no new issues or concersn, was seeing Dr Shell Huffman)        HPI:     HPI   Chief Complaint   Patient presents with    New Patient     history of heart issues, pt is fasting, no new issues or concersn, was seeing Dr Shell Huffman   saw DR Mya Lynch  Last saw in 6-2019. He thinks he may missed an appt with them . He is still on the meds   Sugar 150- 200, he rarely checks his sugar. His last hemoglobin A1c has been very good. He is fasting today for blood work. He is on blood thinners and wants to continue this long-term if the cardiologist will let him. He has had several cardiac events in the past he wants to avoid another one. He does have a pacemaker and this is followed by the cardiologist as well. He is fatigued off that he is not sure if it is from his diabetes or his age but many times he has to take naps. He says he does snore but had a CPAP test a couple years ago and passed it.   Past Medical History:   Diagnosis Date    CAD (coronary artery disease)     Chest pain     GERD (gastroesophageal reflux disease)     History of atherosclerotic cardiovascular disease     Hyperlipidemia     Pacemaker     Syncope       Past Surgical History:   Procedure Laterality Date    CORONARY ANGIOPLASTY WITH STENT PLACEMENT      PACEMAKER PLACEMENT      LA COLONOSCOPY W/BIOPSY SINGLE/MULTIPLE N/A 12/4/2017    Dr AN Rivera-diverticular disease-Tubular AP (-) dysplasia-5 yr recall    LA EGD TRANSORAL BIOPSY SINGLE/MULTIPLE N/A 12/4/2017    Dr AN Rivera-Gastritis       Vitals 8/24/2020 5/6/2020 6/19/2019 3/29/2018 12/4/2017 41/5/3184   SYSTOLIC 860 - [phone] 394   DIASTOLIC 84 - 79 82 81 83   Site Left Upper Arm - - - - -   Position Sitting - - - - -   Cuff Size Medium Adult - - - - -   Pulse 75 54 78 65 72 71   Temp 97.1 97 98.1 97.5 - 97.1   Resp 16 - 17 18 14 14   SpO2 98 98 96 98 97 96   Weight 187 lb 12.8 oz - 186 lb 190 lb - -   Height - - 5' 8\" 5' 8\" - -   BMI (wt*703/ht~2) - - 28.28 kg/m2 28.89 kg/m2 - -   Some recent data might be hidden       History reviewed. No pertinent family history. Social History     Tobacco Use    Smoking status: Never Smoker    Smokeless tobacco: Never Used   Substance Use Topics    Alcohol use: Yes     Comment: rare      Current Outpatient Medications   Medication Sig Dispense Refill    atorvastatin (LIPITOR) 40 MG tablet       ticagrelor (BRILINTA) 90 MG TABS tablet Brilinta 90 mg tablet   TAKE 1 TABLET BY MOUTH EVERY DAY      metFORMIN (GLUCOPHAGE) 500 MG tablet metformin 500 mg tablet   Take 1 tablet twice a day by oral route.  metoprolol (LOPRESSOR) 25 MG tablet   4    ASPIRIN PO Take 81 mg by mouth daily       Esomeprazole Magnesium (NEXIUM PO) Take  by mouth.  predniSONE (DELTASONE) 10 MG tablet Days 1-3 take 1 PO TID, days 4-6 take 1 PO BID, days 7-9 take 1 PO daily. 18 tablet 0     No current facility-administered medications for this visit.       No Known Allergies    Health Maintenance   Topic Date Due    Hepatitis C screen  1965    Diabetic foot exam  06/14/1975    Diabetic retinal exam  06/14/1975    HIV screen  06/14/1980    Shingles Vaccine (1 of 2) 06/14/2015    A1C test (Diabetic or Prediabetic)  02/22/2020    Diabetic microalbuminuria test  02/22/2020    Lipid screen  02/22/2020    Flu vaccine (1) 09/01/2020    Colon cancer screen colonoscopy  12/04/2022    DTaP/Tdap/Td vaccine (2 - Td) 06/19/2029    Hepatitis A vaccine  Aged Out    Hepatitis B vaccine  Aged Out    Hib vaccine  Aged Out    Meningococcal (ACWY) vaccine  Aged Out    Pneumococcal 0-64 years Vaccine  Aged Out       Subjective: Review of Systems   Constitutional: Negative. HENT: Negative. Eyes: Negative. Respiratory: Negative. Cardiovascular: Negative for chest pain, palpitations and leg swelling. Gastrointestinal: Negative. Endocrine: Negative. Genitourinary: Negative. Musculoskeletal: Positive for arthralgias. Allergic/Immunologic: Negative. Neurological: Negative. Hematological: Negative. Psychiatric/Behavioral: Negative. Objective:     Physical Exam  Vitals signs and nursing note reviewed. Constitutional:       Appearance: He is well-developed. HENT:      Head: Normocephalic. Neck:      Vascular: No carotid bruit. Cardiovascular:      Rate and Rhythm: Normal rate and regular rhythm. Heart sounds: Normal heart sounds. Pulmonary:      Effort: Pulmonary effort is normal.      Breath sounds: Normal breath sounds. Skin:     General: Skin is warm and dry. Neurological:      Mental Status: He is alert and oriented to person, place, and time. Psychiatric:         Behavior: Behavior normal.         Thought Content: Thought content normal.         Judgment: Judgment normal.       /84 (Site: Left Upper Arm, Position: Sitting, Cuff Size: Medium Adult)   Pulse 75   Temp 97.1 °F (36.2 °C) (Temporal)   Resp 16   Wt 187 lb 12.8 oz (85.2 kg)   SpO2 98%   BMI 28.55 kg/m²     Assessment:       Diagnosis Orders   1. Coronary artery disease involving native coronary artery of native heart without angina pectoris     2. Hyperlipidemia, unspecified hyperlipidemia type  Lipid Panel   3. Encounter for screening for lipid disorder  Lipid Panel   4. Screening for diabetes mellitus  Comprehensive Metabolic Panel   5. Screening PSA (prostate specific antigen)  PSA Screening   6. Type 2 diabetes mellitus without complication, without long-term current use of insulin ([facility])  Hemoglobin A1C   7.  Fatigue, unspecified type  Testosterone Free and Total Male    CBC         Plan:   More than 50% of

## 2020-08-24 NOTE — PATIENT INSTRUCTIONS
Continue current meds   Continue with Dr Noris Greene, make an appt  May consider cpap for sleep apnea later

## 2020-08-25 LAB
SEX HORMONE BINDING GLOBULIN: 19 NMOL/L (ref 11–80)
TESTOSTERONE FREE-NONMALE: 56.2 PG/ML (ref 47–244)
TESTOSTERONE TOTAL: 221 NG/DL (ref 220–1000)

## 2020-08-28 RX ORDER — TESTOSTERONE CYPIONATE 100 MG/ML
INJECTION, SOLUTION INTRAMUSCULAR
Qty: 2 ML | Refills: 0 | Status: SHIPPED | OUTPATIENT
Start: 2020-08-28 | End: 2020-09-28

## 2020-09-09 ENCOUNTER — PROCEDURE VISIT (OUTPATIENT)
Dept: PRIMARY CARE CLINIC | Age: 55
End: 2020-09-09
Payer: COMMERCIAL

## 2020-09-09 PROCEDURE — 96372 THER/PROPH/DIAG INJ SC/IM: CPT | Performed by: PEDIATRICS

## 2020-09-09 RX ORDER — TESTOSTERONE CYPIONATE 100 MG/ML
100 INJECTION, SOLUTION INTRAMUSCULAR ONCE
Status: COMPLETED | OUTPATIENT
Start: 2020-09-09 | End: 2020-09-09

## 2020-09-09 RX ADMIN — TESTOSTERONE CYPIONATE 100 MG: 100 INJECTION, SOLUTION INTRAMUSCULAR at 15:48

## 2020-09-09 NOTE — PROGRESS NOTES
Patient came into office today for testosterone injection. It was administered in right dorsal gluteal.  Patient did provide the testosterone and syringes for this. After patient received the injection he was given his supplies back to take with him.

## 2020-09-24 ENCOUNTER — NURSE ONLY (OUTPATIENT)
Dept: PRIMARY CARE CLINIC | Age: 55
End: 2020-09-24
Payer: COMMERCIAL

## 2020-09-24 PROCEDURE — 96372 THER/PROPH/DIAG INJ SC/IM: CPT | Performed by: NURSE PRACTITIONER

## 2020-09-24 RX ORDER — TESTOSTERONE CYPIONATE 200 MG/ML
100 INJECTION INTRAMUSCULAR ONCE
Status: COMPLETED | OUTPATIENT
Start: 2020-09-24 | End: 2020-09-24

## 2020-09-24 RX ADMIN — TESTOSTERONE CYPIONATE 100 MG: 200 INJECTION INTRAMUSCULAR at 16:06

## 2020-09-24 NOTE — PROGRESS NOTES
Testosterone 100mg given Im in the left upper outer quadrant of the left hip. Pt tolerated the injection well.

## 2020-09-30 ENCOUNTER — OFFICE VISIT (OUTPATIENT)
Dept: CARDIOLOGY | Facility: CLINIC | Age: 55
End: 2020-09-30

## 2020-09-30 ENCOUNTER — CLINICAL SUPPORT NO REQUIREMENTS (OUTPATIENT)
Dept: CARDIOLOGY | Facility: CLINIC | Age: 55
End: 2020-09-30

## 2020-09-30 VITALS
DIASTOLIC BLOOD PRESSURE: 76 MMHG | HEART RATE: 68 BPM | HEIGHT: 68 IN | SYSTOLIC BLOOD PRESSURE: 126 MMHG | WEIGHT: 190 LBS | BODY MASS INDEX: 28.79 KG/M2 | OXYGEN SATURATION: 99 %

## 2020-09-30 DIAGNOSIS — E78.2 MIXED HYPERLIPIDEMIA: ICD-10-CM

## 2020-09-30 DIAGNOSIS — I49.5 SICK SINUS SYNDROME (HCC): ICD-10-CM

## 2020-09-30 DIAGNOSIS — E11.9 TYPE 2 DIABETES MELLITUS WITHOUT COMPLICATION, WITHOUT LONG-TERM CURRENT USE OF INSULIN (HCC): ICD-10-CM

## 2020-09-30 DIAGNOSIS — Z95.0 PACEMAKER: Primary | ICD-10-CM

## 2020-09-30 DIAGNOSIS — I25.10 ATHEROSCLEROSIS OF NATIVE CORONARY ARTERY OF NATIVE HEART WITHOUT ANGINA PECTORIS: Primary | ICD-10-CM

## 2020-09-30 PROCEDURE — 99214 OFFICE O/P EST MOD 30 MIN: CPT | Performed by: INTERNAL MEDICINE

## 2020-09-30 PROCEDURE — 93288 INTERROG EVL PM/LDLS PM IP: CPT | Performed by: INTERNAL MEDICINE

## 2020-09-30 PROCEDURE — 93000 ELECTROCARDIOGRAM COMPLETE: CPT | Performed by: INTERNAL MEDICINE

## 2020-09-30 RX ORDER — DAPAGLIFLOZIN 10 MG/1
10 TABLET, FILM COATED ORAL DAILY
Qty: 30 TABLET | Refills: 11 | Status: SHIPPED | OUTPATIENT
Start: 2020-09-30 | End: 2021-10-29

## 2020-09-30 NOTE — PROGRESS NOTES
Subjective:     Encounter Date:09/30/2020      Patient ID: Fred Atkins is a 55 y.o. male with coronary artery disease, status post previous PCI to LAD and RCA and more recently a PCI to the RCA (while in [place]), pacemaker in place (by Dr. Cam), here for follow-up.    Chief Complaint: Routine follow-up    This patient presents today for routine follow-up of coronary artery disease.  He has been doing well.  He has not had any recurrent chest pain.  It has been a couple of years since his most recent stent.  He does remain on Brilinta.  He says that previously when off of antiplatelets, he has required more stents although the mechanism of this is unclear to the patient whether this was due to thrombosis or restenosis, etc.  At this time, no significant chest pain.  No significant shortness of breath or dyspnea on exertion.  He does have some mild intermittent ankle edema.  No problems with current medications.  His triglycerides were found to be elevated recently but his LDL cholesterol was under reasonable control.  He has not had any side effects from Lipitor.  He is on metformin alone for diabetes.  His hemoglobin A1c was mildly elevated at last check.  He is trying to be somewhat more physically active.  No orthopnea, PND.  No lightheadedness, dizziness, syncope, palpitations.  Blood pressure is generally well controlled.    Coronary Artery Disease  Presents for follow-up visit. Symptoms include leg swelling (ankles). Pertinent negatives include no chest pain, dizziness, palpitations or shortness of breath. The symptoms have been stable. Compliance with diet is variable. Compliance with exercise is variable. Compliance with medications is good.       Current Outpatient Medications:   •  aspirin 81 MG EC tablet, Take 81 mg by mouth Daily., Disp: , Rfl:   •  atorvastatin (LIPITOR) 40 MG tablet, Take 40 mg by mouth Every Night., Disp: , Rfl:   •  metFORMIN (GLUCOPHAGE) 500 MG tablet, Take 500 mg by  mouth 2 (Two) Times a Day With Meals., Disp: , Rfl:   •  metoprolol tartrate (LOPRESSOR) 25 MG tablet, Take 25 mg by mouth Daily., Disp: , Rfl:   •  nitroglycerin (NITROSTAT) 0.4 MG SL tablet, Place 0.4 mg under the tongue As Needed., Disp: , Rfl: 2  •  omeprazole (priLOSEC) 20 MG capsule, Take 20 mg by mouth Daily As Needed., Disp: , Rfl:   •  ticagrelor (BRILINTA) 60 MG tablet tablet, Take 1 tablet by mouth 2 (Two) Times a Day., Disp: 180 tablet, Rfl: 3  •  Dapagliflozin Propanediol (Farxiga) 10 MG tablet, Take 10 mg by mouth Daily., Disp: 30 tablet, Rfl: 11    No Known Allergies    Social History     Tobacco Use   • Smoking status: Never Smoker   • Smokeless tobacco: Never Used   Substance Use Topics   • Alcohol use: Yes     Comment: OCCASIONALLY     Review of Systems   Constitution: Negative for fever and weight loss.   Cardiovascular: Positive for leg swelling (ankles). Negative for chest pain, dyspnea on exertion, orthopnea, palpitations, paroxysmal nocturnal dyspnea and syncope.   Respiratory: Negative for cough, shortness of breath and wheezing.    Hematologic/Lymphatic: Negative for bleeding problem. Does not bruise/bleed easily.   Gastrointestinal: Negative for abdominal pain, hematemesis, hematochezia, melena, nausea and vomiting.   Neurological: Negative for dizziness, headaches and loss of balance.       ECG 12 Lead    Date/Time: 9/30/2020 3:41 PM  Performed by: Derek Baires MD  Authorized by: Derek Baires MD   Rhythm: sinus rhythm  Rate: normal  BPM: 68  Conduction: conduction normal  T inversion: III, II and aVF  QRS axis: indeterminate  Other findings: poor R wave progression    Clinical impression: abnormal EKG             Objective:     Vitals signs reviewed.   Constitutional:       General: Not in acute distress.     Appearance: Healthy appearance. Well-developed.   Eyes:      Extraocular Movements: Extraocular movements intact.      Pupils: Pupils are equal, round, and  "reactive to light.   HENT:      Head: Normocephalic and atraumatic.    Mouth/Throat:      Pharynx: Oropharynx is clear.   Neck:      Musculoskeletal: Normal range of motion and neck supple.      Thyroid: No thyroid mass.      Vascular: No JVD.   Pulmonary:      Effort: Pulmonary effort is normal.      Breath sounds: Normal breath sounds. No wheezing. No rhonchi. No rales.   Cardiovascular:      Normal rate. Regular rhythm.      Murmurs: There is no murmur.      No gallop.   Pulses:     Intact distal pulses.   Edema:     Peripheral edema absent.   Abdominal:      General: Bowel sounds are normal. There is no distension.      Palpations: Abdomen is soft.      Tenderness: There is no abdominal tenderness.   Musculoskeletal: Normal range of motion.      Extremities: No clubbing present.  Skin:     General: Skin is warm and dry. There is no cyanosis.      Findings: No erythema or rash.   Neurological:      Mental Status: Alert and oriented to person, place, and time.      Cranial Nerves: No cranial nerve deficit.       /76   Pulse 68   Ht 172.7 cm (68\")   Wt 86.2 kg (190 lb)   SpO2 99%   BMI 28.89 kg/m²     Data/Lab Review:     I did review today's pacemaker interrogation.  Patient does have a Medtronic dual-chamber pacemaker.  He is atrial paced 14.2% of the time while being ventricular paced 0.3% of the time.  Estimated battery life of 3.5 years.  A few short runs of SVT were noted.    ================================================    Lab Results   Component Value Date    CHLPL 175 08/24/2020    TRIG 517 (H) 08/24/2020    HDL 37 (L) 08/24/2020    LDL see below 08/24/2020     LDL 79    ========================================================    Per Records from [place]:  \"Pleasant 54 yo man with CRF that include + FH of CAD and hyperlipidemia  admitted in 11/10 for ACS. Patient had ~ 6 month history of angina and  presented to [place] in 6/10 with chest pain and symptomatic bradycardia.  Found to have " "significant RCA stenosis that was stented (details not  available). Two weeks later had syncopal episode that by report was due  to bradycardia and PPM was implanted at that time. Did well until 11/10  when he had recurrent angina. Had missed two doses of plavix. No  elevation in cardiac enzymes. Coronary angiography revealed severe  in-stent restenosis which was treated with a JOZEF. Had distal RCA lesion  treated with Promus JOZEF. Normal LV systolic function.    The patient was admitted to [facility] in [place] in 4/15  with ACS (troponin normal). Coronary angiogram from 4/3/15 revealed  normal LM, 60% D1 (small), small ramus with 75-85% stenosis, mild  disease in LCX, dominant right with patent stents, 95% in the PL.  Underwent PCI with Xience stent. Returned to see me in 12/15 with  recurrent exertional chest pain. Underwent repeat angiography on 12/15  and found to have 90% pLAD, 99% mLAD and 90% PDA (all new lesions). Had  JOZEF deployed in proximal and mid LAD lesions, POBA of PL.\"      Assessment:          Diagnosis Plan   1. Atherosclerosis of native coronary artery of native heart without angina pectoris  ECG 12 Lead    ticagrelor (BRILINTA) 60 MG tablet tablet   2. Sick sinus syndrome (CMS/HCC)     3. Type 2 diabetes mellitus without complication, without long-term current use of insulin (CMS/HCC)  Dapagliflozin Propanediol (Farxiga) 10 MG tablet   4. Mixed hyperlipidemia          Plan:       1.  Coronary artery disease: Stable at this time.  Patient continues aspirin and will indefinitely.  I think that this far out from his most recent PCI, he does not need 90 mg twice daily of Brilinta any longer but may benefit from the lower dose of 60 mg twice daily.  We will change his prescription to reflect this.  He will also continue beta-blocker and statin therapies.    2.  Sick sinus syndrome: Appropriate device function at this time.  Clinically stable.    3.  Type 2 diabetes mellitus without " complication without long-term use of insulin: The patient is on metformin alone.  I feel that an an effort to lower his cardiovascular risk and potential risk of heart failure, he would benefit from Farxiga.  We will prescribe 10 mg daily for him.  We did discuss the medication, its potential side effects and the rationale for its use today and he is agreeable to start this medication.  He also knows to continue taking metformin.    4.  Mixed hyperlipidemia: The patient's LDL cholesterol is not quite at goal but he says that he will be trying to do better with diet and exercise.  He was noted to have LDL cholesterol at goal previously, therefore with efforts at diet, exercise, etc. he may be able to achieve this level once again but if not he may need to be considered for an alternative high intensity statin.    Patient's Body mass index is 28.89 kg/m². BMI is above normal parameters. Recommendations include: exercise counseling and nutrition counseling.    Follow-up in 12 months unless otherwise needed sooner.

## 2020-10-12 ENCOUNTER — NURSE ONLY (OUTPATIENT)
Dept: PRIMARY CARE CLINIC | Age: 55
End: 2020-10-12
Payer: COMMERCIAL

## 2020-10-12 PROCEDURE — 96372 THER/PROPH/DIAG INJ SC/IM: CPT | Performed by: NURSE PRACTITIONER

## 2020-10-12 NOTE — PROGRESS NOTES
Pt brought in his testosterone for his injection. 1 ml of the Testosterone given IM in the left hip as ordered. Pt tolerated without diff.

## 2021-03-25 ENCOUNTER — OFFICE VISIT (OUTPATIENT)
Dept: PRIMARY CARE CLINIC | Age: 56
End: 2021-03-25
Payer: COMMERCIAL

## 2021-03-25 VITALS
OXYGEN SATURATION: 99 % | DIASTOLIC BLOOD PRESSURE: 78 MMHG | RESPIRATION RATE: 16 BRPM | WEIGHT: 181.8 LBS | HEIGHT: 68 IN | BODY MASS INDEX: 27.55 KG/M2 | HEART RATE: 65 BPM | SYSTOLIC BLOOD PRESSURE: 130 MMHG | TEMPERATURE: 97.6 F

## 2021-03-25 DIAGNOSIS — E78.5 HYPERLIPIDEMIA, UNSPECIFIED HYPERLIPIDEMIA TYPE: ICD-10-CM

## 2021-03-25 DIAGNOSIS — E29.1 TESTOSTERONE DEFICIENCY IN MALE: ICD-10-CM

## 2021-03-25 DIAGNOSIS — Z12.5 SCREENING PSA (PROSTATE SPECIFIC ANTIGEN): ICD-10-CM

## 2021-03-25 DIAGNOSIS — I25.10 CORONARY ARTERY DISEASE INVOLVING NATIVE CORONARY ARTERY OF NATIVE HEART WITHOUT ANGINA PECTORIS: ICD-10-CM

## 2021-03-25 DIAGNOSIS — Z00.00 WELL ADULT EXAM: Primary | ICD-10-CM

## 2021-03-25 DIAGNOSIS — E11.9 TYPE 2 DIABETES MELLITUS WITHOUT COMPLICATION, WITHOUT LONG-TERM CURRENT USE OF INSULIN (HCC): ICD-10-CM

## 2021-03-25 LAB
ALBUMIN SERPL-MCNC: 4.6 G/DL (ref 3.5–5.2)
ALP BLD-CCNC: 174 U/L (ref 40–130)
ALT SERPL-CCNC: 61 U/L (ref 5–41)
ANION GAP SERPL CALCULATED.3IONS-SCNC: 8 MMOL/L (ref 7–19)
AST SERPL-CCNC: 32 U/L (ref 5–40)
BILIRUB SERPL-MCNC: 1.9 MG/DL (ref 0.2–1.2)
BUN BLDV-MCNC: 12 MG/DL (ref 6–20)
CALCIUM SERPL-MCNC: 9.4 MG/DL (ref 8.6–10)
CHLORIDE BLD-SCNC: 102 MMOL/L (ref 98–111)
CHOLESTEROL, TOTAL: 182 MG/DL (ref 160–199)
CO2: 30 MMOL/L (ref 22–29)
CREAT SERPL-MCNC: 0.7 MG/DL (ref 0.5–1.2)
CREATININE URINE: 65.6 MG/DL (ref 4.2–622)
GFR AFRICAN AMERICAN: >59
GFR NON-AFRICAN AMERICAN: >60
GLUCOSE BLD-MCNC: 145 MG/DL (ref 74–109)
HBA1C MFR BLD: 6.9 % (ref 4–6)
HDLC SERPL-MCNC: 38 MG/DL (ref 55–121)
LDL CHOLESTEROL CALCULATED: 75 MG/DL
MICROALBUMIN UR-MCNC: <1.2 MG/DL (ref 0–19)
MICROALBUMIN/CREAT UR-RTO: NORMAL MG/G
POTASSIUM SERPL-SCNC: 4.5 MMOL/L (ref 3.5–5)
SODIUM BLD-SCNC: 140 MMOL/L (ref 136–145)
TOTAL PROTEIN: 7.1 G/DL (ref 6.6–8.7)
TRIGL SERPL-MCNC: 347 MG/DL (ref 0–149)

## 2021-03-25 PROCEDURE — 99396 PREV VISIT EST AGE 40-64: CPT | Performed by: NURSE PRACTITIONER

## 2021-03-25 PROCEDURE — 36415 COLL VENOUS BLD VENIPUNCTURE: CPT | Performed by: NURSE PRACTITIONER

## 2021-03-25 RX ORDER — TESTOSTERONE 20.25 MG/1.25G
GEL TOPICAL
Qty: 60 G | Refills: 0 | Status: SHIPPED | OUTPATIENT
Start: 2021-03-25 | End: 2021-04-25

## 2021-03-25 RX ORDER — METHYLPREDNISOLONE 4 MG/1
TABLET ORAL
Qty: 1 KIT | Refills: 0 | Status: SHIPPED | OUTPATIENT
Start: 2021-03-25 | End: 2021-03-31

## 2021-03-25 ASSESSMENT — PATIENT HEALTH QUESTIONNAIRE - PHQ9
2. FEELING DOWN, DEPRESSED OR HOPELESS: 0
1. LITTLE INTEREST OR PLEASURE IN DOING THINGS: 0
SUM OF ALL RESPONSES TO PHQ QUESTIONS 1-9: 0
SUM OF ALL RESPONSES TO PHQ QUESTIONS 1-9: 0

## 2021-03-25 ASSESSMENT — ENCOUNTER SYMPTOMS
EYES NEGATIVE: 1
GASTROINTESTINAL NEGATIVE: 1
BACK PAIN: 1
RESPIRATORY NEGATIVE: 1
ALLERGIC/IMMUNOLOGIC NEGATIVE: 1

## 2021-03-25 NOTE — PATIENT INSTRUCTIONS
Start the Luwanna Collie, continue the metformin. Add exercise to work on triglycerides down  Patient Education        Cholesterol and Triglycerides Tests: About These Tests  What are they? Cholesterol and triglycerides tests measure the amount of fats in your blood. These fats have both \"good\" (HDL) and \"bad\" (LDL) cholesterol. Why are these tests done? These tests are done to help find out your risk of a heart attack and stroke. Your doctor uses your cholesterol levels plus other things such as blood pressure, age, and sex to calculate your risk. These tests also help your doctor find out how well medicine is working for some health problems. How do you prepare for these tests? · Your doctor may ask you to not eat or drink anything except water for 9 to 14 hours before the tests. In most cases, you can take your medicines with water the morning of the test.  · Do not drink alcohol for 24 hours before the tests. · Tell your doctor ALL the medicines, vitamins, supplements, and herbal remedies you take. Some may increase the risk of problems during your test. Your doctor will tell you if you should stop taking any of them before the test and how soon to do it. How are these tests done? A health professional uses a needle to take a blood sample, usually from the arm. Where can you learn more? Go to https://Tallyfy.frestyl. org and sign in to your [employer] account. Enter R998 in the [facility] box to learn more about \"Cholesterol and Triglycerides Tests: About These Tests. \"     If you do not have an account, please click on the \"Sign Up Now\" link. Current as of: August 31, 2020               Content Version: 12.8  © 5581-3736 Healthwise, Incorporated. Care instructions adapted under license by [facility] ([facility]).  If you have questions about a medical condition or this instruction, always ask your healthcare professional. [facility] any warranty or liability for your

## 2021-03-25 NOTE — PROGRESS NOTES
[facility] PHYSICIAN SERVICES  [facility]  [address] [place] [address]  [address] [place] [address]  Dept: [phone]  Dept Fax: [phone]  Loc: [phone]    Pretty Ramirez is a 54 y.o. male who presents today for his medical conditions/complaints as noted below. Pretty Ramirez is c/o of Annual Exam (We follow this patient for diabetes, hyperlipedemia and low testosterone. )        HPI:     HPI   Chief Complaint   Patient presents with    Annual Exam     We follow this patient for diabetes, hyperlipedemia and low testosterone. rarely checks sugar, but has lost some weight  He has low back pain flare up twice a year. He would like to have something on hand when his back flares up. He did a few testosterone shots and couldn't get anyone else to do them so he stopped. He might have felt a little better. He has a friend that he might be able to asked do the shots but he is willing to try the gel first.  Still seeing Dr. Kristina Desir for his heart. When I reviewed the notes it looks like he is supposed to be on Farxiga. He did pick it up and he thinks he may have taken it for a month but then forgot to  refills. He is willing to try to take it again. He still is on his Lipitor 40 mg not having any problems with it.   Past Medical History:   Diagnosis Date    CAD (coronary artery disease)     Chest pain     GERD (gastroesophageal reflux disease)     History of atherosclerotic cardiovascular disease     Hyperlipidemia     Pacemaker     Syncope       Past Surgical History:   Procedure Laterality Date    CORONARY ANGIOPLASTY WITH STENT PLACEMENT      PACEMAKER PLACEMENT      [place] COLONOSCOPY W/BIOPSY SINGLE/MULTIPLE N/A 12/4/2017    Dr AN Rivera-diverticular disease-Tubular AP (-) dysplasia-5 yr recall    [place] EGD TRANSORAL BIOPSY SINGLE/MULTIPLE N/A 12/4/2017    Dr AN Rivera-Gastritis       Vitals 3/25/2021 8/24/2020 5/6/2020 6/19/2019 3/29/2018 24/9/6028   SYSTOLIC 992 360 - 219 535 -   DIASTOLIC 78 84 - 79 82 -   Site - Left Upper Arm - - - -   Position - Sitting - - - -   Cuff Size - Medium Adult - - - -   Pulse 65 75 54 78 65 -   Temp 97.6 97.1 97 98.1 97.5 -   Resp 16 16 - 17 18 13   SpO2 99 98 98 96 98 95   Weight 181 lb 12.8 oz 187 lb 12.8 oz - 186 lb 190 lb -   Height 5' 8\" - - 5' 8\" 5' 8\" -   Body mass index 27.64 kg/m2 - - 28.28 kg/m2 28.89 kg/m2 -   Some recent data might be hidden       No family history on file. Social History     Tobacco Use    Smoking status: Never Smoker    Smokeless tobacco: Never Used   Substance Use Topics    Alcohol use: Yes     Comment: rare      Current Outpatient Medications   Medication Sig Dispense Refill    methylPREDNISolone (MEDROL DOSEPACK) 4 MG tablet Take by mouth. For back pain flare up, watch blood sugar. 1 kit 0    Testosterone 20.25 MG/1.25GM (1.62%) GEL Apply 2 pumps of gel daily for testosterone 60 g 0    metFORMIN (GLUCOPHAGE) 500 MG tablet 2 PO bid 120 tablet 3    atorvastatin (LIPITOR) 40 MG tablet       ticagrelor (BRILINTA) 90 MG TABS tablet Brilinta 90 mg tablet   TAKE 1 TABLET BY MOUTH EVERY DAY      metoprolol (LOPRESSOR) 25 MG tablet   4    ASPIRIN PO Take 81 mg by mouth daily       Esomeprazole Magnesium (NEXIUM PO) Take  by mouth.  testosterone cypionate (DEPOTESTOTERONE CYPIONATE) 100 MG/ML injection 1 ml IM every 2 weeks for 8 weeks then recheck level. 2 mL 0    SYRINGE-NEEDLE, DISP, 3 ML 22G X 1-1/2\" 3 ML MISC Testosterone IM every 2 wks (Patient not taking: Reported on 3/25/2021) 12 each 3     No current facility-administered medications for this visit.       No Known Allergies    Health Maintenance   Topic Date Due    Hepatitis C screen  Never done    Diabetic retinal exam  Never done    HIV screen  Never done    Shingles Vaccine (1 of 2) Never done    Diabetic microalbuminuria test  02/22/2020    Flu vaccine (1) Never done    A1C test (Diabetic or Prediabetic)  08/24/2021    Lipid screen  08/24/2021    Diabetic foot exam  03/25/2022    Colon cancer screen colonoscopy  12/04/2022    DTaP/Tdap/Td vaccine (2 - Td) 06/19/2029    Hepatitis A vaccine  Aged Out    Hepatitis B vaccine  Aged Out    Hib vaccine  Aged Out    Meningococcal (ACWY) vaccine  Aged Out    Pneumococcal 0-64 years Vaccine  Aged Out       Subjective:      Review of Systems   Constitutional: Negative. HENT: Negative. Eyes: Negative. Respiratory: Negative. Cardiovascular: Negative. Gastrointestinal: Negative. Endocrine: Negative. Genitourinary: Negative. Musculoskeletal: Positive for back pain. Allergic/Immunologic: Negative. Neurological: Negative. Hematological: Negative. Psychiatric/Behavioral: Negative. Objective:     Physical Exam  Vitals signs and nursing note reviewed. Constitutional:       Appearance: He is well-developed. HENT:      Head: Normocephalic. Right Ear: Tympanic membrane and external ear normal.      Left Ear: Tympanic membrane and external ear normal.      Nose: Nose normal.   Eyes:      Pupils: Pupils are equal, round, and reactive to light. Neck:      Musculoskeletal: Normal range of motion. Cardiovascular:      Rate and Rhythm: Normal rate and regular rhythm. Heart sounds: Normal heart sounds. Pulmonary:      Effort: Pulmonary effort is normal.      Breath sounds: Normal breath sounds. Abdominal:      General: Abdomen is flat. Bowel sounds are normal.   Genitourinary:     Comments: Declined  exam.  Musculoskeletal: Normal range of motion. Skin:     General: Skin is warm and dry. Capillary Refill: Capillary refill takes less than 2 seconds. Neurological:      General: No focal deficit present. Mental Status: He is alert and oriented to person, place, and time. Psychiatric:         Mood and Affect: Mood normal.         Behavior: Behavior normal.         Thought Content:  Thought content normal.         Judgment: Judgment normal.       /78   Pulse 65 sugar.     Dispense:  1 kit     Refill:  0    Testosterone 20.25 MG/1.25GM (1.62%) GEL     Sig: Apply 2 pumps of gel daily for testosterone     Dispense:  60 g     Refill:  0     Do quantity sufficient, show him how to use         ORDERS:  Orders Placed This Encounter   Procedures    Microalbumin / Creatinine Urine Ratio    Comprehensive Metabolic Panel    Hemoglobin A1C    Lipid Panel    Testosterone Free and Total Male     DIABETES FOOT EXAM       Follow-up:  No follow-ups on file. PATIENT INSTRUCTIONS:  Patient Instructions   Start the farxiga, continue the metformin. Add exercise to work on triglycerides down  Patient Education        Cholesterol and Triglycerides Tests: About These Tests  What are they? Cholesterol and triglycerides tests measure the amount of fats in your blood. These fats have both \"good\" (HDL) and \"bad\" (LDL) cholesterol. Why are these tests done? These tests are done to help find out your risk of a heart attack and stroke. Your doctor uses your cholesterol levels plus other things such as blood pressure, age, and sex to calculate your risk. These tests also help your doctor find out how well medicine is working for some health problems. How do you prepare for these tests? · Your doctor may ask you to not eat or drink anything except water for 9 to 14 hours before the tests. In most cases, you can take your medicines with water the morning of the test.  · Do not drink alcohol for 24 hours before the tests. · Tell your doctor ALL the medicines, vitamins, supplements, and herbal remedies you take. Some may increase the risk of problems during your test. Your doctor will tell you if you should stop taking any of them before the test and how soon to do it. How are these tests done? A health professional uses a needle to take a blood sample, usually from the arm. Where can you learn more? Go to https://dali.health-partners. org and sign in to your "[employer] account. Enter N978 in the [facility] box to learn more about \"Cholesterol and Triglycerides Tests: About These Tests. \"     If you do not have an account, please click on the \"Sign Up Now\" link. Current as of: August 31, 2020               Content Version: 12.8  © 3185-7157 Healthwise, Incorporated. Care instructions adapted under license by [facility] ([facility]). If you have questions about a medical condition or this instruction, always ask your healthcare professional. [place] [address] any warranty or liability for your use of this information. Electronically signed by Jacquelin Dancer, APRN - CNP on 3/25/2021 at 11:47 AM    EMR Dragon/transcription disclaimer:  Much of thisencounter note is electronic transcription/translation of spoken language to printed texts. The electronic translation of spoken language may be erroneous, or at times, nonsensical words or phrases may be inadvertentlytranscribed.   Although I have reviewed the note for such errors, some may still exist.

## 2021-03-26 LAB
SEX HORMONE BINDING GLOBULIN: 23 NMOL/L (ref 11–80)
TESTOSTERONE FREE-NONMALE: 59.5 PG/ML (ref 47–244)
TESTOSTERONE TOTAL: 253 NG/DL (ref 220–1000)

## 2021-09-27 RX ORDER — ATORVASTATIN CALCIUM 40 MG/1
TABLET, FILM COATED ORAL
Qty: 90 TABLET | Refills: 3 | Status: SHIPPED | OUTPATIENT
Start: 2021-09-27 | End: 2022-08-11

## 2021-09-29 ENCOUNTER — OFFICE VISIT (OUTPATIENT)
Dept: CARDIOLOGY | Facility: CLINIC | Age: 56
End: 2021-09-29

## 2021-09-29 ENCOUNTER — CLINICAL SUPPORT NO REQUIREMENTS (OUTPATIENT)
Dept: CARDIOLOGY | Facility: CLINIC | Age: 56
End: 2021-09-29

## 2021-09-29 VITALS
SYSTOLIC BLOOD PRESSURE: 110 MMHG | BODY MASS INDEX: 27.58 KG/M2 | DIASTOLIC BLOOD PRESSURE: 82 MMHG | HEIGHT: 68 IN | OXYGEN SATURATION: 98 % | WEIGHT: 182 LBS | HEART RATE: 72 BPM

## 2021-09-29 DIAGNOSIS — I49.5 SICK SINUS SYNDROME (HCC): ICD-10-CM

## 2021-09-29 DIAGNOSIS — I73.9 INTERMITTENT CLAUDICATION (HCC): Primary | ICD-10-CM

## 2021-09-29 DIAGNOSIS — E78.2 MIXED HYPERLIPIDEMIA: ICD-10-CM

## 2021-09-29 DIAGNOSIS — I25.10 ATHEROSCLEROSIS OF NATIVE CORONARY ARTERY OF NATIVE HEART WITHOUT ANGINA PECTORIS: ICD-10-CM

## 2021-09-29 PROCEDURE — 93288 INTERROG EVL PM/LDLS PM IP: CPT | Performed by: PHYSICIAN ASSISTANT

## 2021-09-29 PROCEDURE — 93000 ELECTROCARDIOGRAM COMPLETE: CPT | Performed by: INTERNAL MEDICINE

## 2021-09-29 PROCEDURE — 99214 OFFICE O/P EST MOD 30 MIN: CPT | Performed by: INTERNAL MEDICINE

## 2021-09-29 NOTE — PROGRESS NOTES
Subjective:     Encounter Date:09/29/2021      Patient ID: Fred Atkins is a 56 y.o. male with coronary artery disease, status post previous PCI to LAD and RCA and more recently a PCI to the RCA (while in [place]), pacemaker in place (by Dr. Cam), here for follow-up.    Chief Complaint: Here today for routine follow-up    This patient presents today for routine follow-up.  He says that he has been doing well and feeling well.  He has had no recurrent chest pain.  He has had a history of multivessel PCI.  No significant shortness of breath, dyspnea on exertion.  No palpitations, lightheadedness, dizziness, syncope.  No lower extremity edema, orthopnea, PND.  He has, however noted some weakness in his legs with exertion.  No specific pain, however his legs seem to be very heavy with exertion, relieved with rest.  He denies focal weakness, numbness, tingling, etc.  He reports that his blood pressure has been well controlled.  He also reports reasonable control of his cholesterol.  He has not experienced any side effects from any of his current medications.  He does have a pacemaker in place.  He has not had any problems with this device.    Coronary Artery Disease  Presents for follow-up visit. Pertinent negatives include no chest pain, dizziness, leg swelling, palpitations or shortness of breath. The symptoms have been stable. Compliance with diet is variable. Compliance with exercise is variable. Compliance with medications is good.       Current Outpatient Medications:   •  aspirin 81 MG EC tablet, Take 81 mg by mouth Daily., Disp: , Rfl:   •  atorvastatin (LIPITOR) 40 MG tablet, Take 40 mg by mouth Every Night., Disp: , Rfl:   •  Dapagliflozin Propanediol (Farxiga) 10 MG tablet, Take 10 mg by mouth Daily., Disp: 30 tablet, Rfl: 11  •  metFORMIN (GLUCOPHAGE) 500 MG tablet, Take 500 mg by mouth 2 (Two) Times a Day With Meals., Disp: , Rfl:   •  metoprolol tartrate (LOPRESSOR) 25 MG tablet, Take 25 mg by  mouth Daily., Disp: , Rfl:   •  nitroglycerin (NITROSTAT) 0.4 MG SL tablet, Place 0.4 mg under the tongue As Needed., Disp: , Rfl: 2  •  omeprazole (priLOSEC) 20 MG capsule, Take 20 mg by mouth Daily As Needed., Disp: , Rfl:   •  ticagrelor (BRILINTA) 60 MG tablet tablet, Take 1 tablet by mouth 2 (Two) Times a Day., Disp: 180 tablet, Rfl: 3    No Known Allergies    Social History     Tobacco Use   • Smoking status: Never Smoker   • Smokeless tobacco: Never Used   Substance Use Topics   • Alcohol use: Yes     Comment: OCCASIONALLY     Review of Systems   Constitutional: Negative for fever and weight loss.   Cardiovascular: Positive for claudication. Negative for chest pain, dyspnea on exertion, leg swelling, orthopnea, palpitations, paroxysmal nocturnal dyspnea and syncope.   Respiratory: Negative for cough, shortness of breath and wheezing.    Hematologic/Lymphatic: Negative for adenopathy and bleeding problem.   Gastrointestinal: Negative for abdominal pain, nausea and vomiting.   Neurological: Negative for dizziness, headaches and loss of balance.         ECG 12 Lead    Date/Time: 9/29/2021 1:44 PM  Performed by: Derek Baires MD  Authorized by: Derek Baires MD   Comparison: compared with previous ECG from 9/30/2020  Similar to previous ECG  Rhythm: sinus rhythm  Rate: normal  BPM: 79  Conduction: conduction normal  T inversion: II, III and aVF  QRS axis: right  Other findings: poor R wave progression    Clinical impression: abnormal EKG               Objective:     Vitals reviewed.   Constitutional:       General: Not in acute distress.     Appearance: Healthy appearance. Well-developed.   Eyes:      Extraocular Movements: Extraocular movements intact.      Pupils: Pupils are equal, round, and reactive to light.   HENT:      Head: Normocephalic and atraumatic.   Neck:      Thyroid: No thyroid mass.      Vascular: No JVD.   Pulmonary:      Effort: Pulmonary effort is normal.      Breath  "sounds: Normal breath sounds. No wheezing. No rhonchi. No rales.   Cardiovascular:      Normal rate. Regular rhythm.      Murmurs: There is no murmur.      No gallop.   Pulses:     Decreased pulses.      Dorsalis pedis: 1+ bilaterally.     Posterior tibial: 1+ bilaterally.  Edema:     Peripheral edema absent.   Abdominal:      General: Bowel sounds are normal. There is no distension.      Palpations: Abdomen is soft.      Tenderness: There is no abdominal tenderness.   Musculoskeletal: Normal range of motion.      Extremities: No clubbing present.     Cervical back: Normal range of motion and neck supple. Skin:     General: Skin is warm and dry. There is no cyanosis.      Findings: No erythema or rash.   Neurological:      Mental Status: Alert and oriented to person, place, and time.      Cranial Nerves: No cranial nerve deficit.       /82   Pulse 72   Ht 172.7 cm (68\")   Wt 82.6 kg (182 lb)   SpO2 98%   BMI 27.67 kg/m²     Data/Lab Review:     I reviewed today's pacemaker interrogation.  Adequate battery reserve is noted.  Approximately 19% atrial pacing with 0.2% ventricular pacing.  No prolonged arrhythmias.    Lipids 3/2021: LDL 75, HDL 38, Tg 347    A1C on 3/25/21: 6.9        Assessment:          Diagnosis Plan   1. Intermittent claudication (HCC)  US Arterial Doppler Lower Extremity Complete   2. Atherosclerosis of native coronary artery of native heart without angina pectoris     3. Sick sinus syndrome (CMS/HCC)  ECG 12 Lead   4. Mixed hyperlipidemia          Plan:       1.  Intermittent claudication: The patient describes symptoms possibly consistent with intermittent claudication.  His peripheral pulses seem somewhat diminished.  For this reason, I think that bilateral lower extremity JACIEL exam with segmental waveforms is reasonable to further evaluate for the presence of peripheral vascular disease.    2.  Coronary artery disease: The patient remains stable at this time.  He does not describe any " ischemic symptoms.  He is on aspirin and is on a lower dose of Brilinta given multivessel PCI in the past.  He is also on beta-blocker and statin therapies.     3.  Sick sinus syndrome: The patient's device continues to be followed in the pacemaker clinic and is appropriately functioning.     4.  Mixed hyperlipidemia: LDL cholesterol is very near goal on 40 mg of Lipitor.  Continue this medication along with efforts at diet, exercise, etc.     We will await the results of the patient's peripheral arterial studies and make further plans at that time if indicated.  Otherwise, we will plan to see patient back in 12 months unless otherwise needed sooner.

## 2021-10-07 ENCOUNTER — HOSPITAL ENCOUNTER (OUTPATIENT)
Dept: ULTRASOUND IMAGING | Facility: HOSPITAL | Age: 56
Discharge: HOME | End: 2021-10-07
Admitting: INTERNAL MEDICINE

## 2021-10-07 DIAGNOSIS — I73.9 INTERMITTENT CLAUDICATION (HCC): ICD-10-CM

## 2021-10-07 PROCEDURE — 93923 UPR/LXTR ART STDY 3+ LVLS: CPT

## 2021-10-07 PROCEDURE — 93923 UPR/LXTR ART STDY 3+ LVLS: CPT | Performed by: SURGERY

## 2021-10-29 DIAGNOSIS — E11.9 TYPE 2 DIABETES MELLITUS WITHOUT COMPLICATION, WITHOUT LONG-TERM CURRENT USE OF INSULIN (HCC): ICD-10-CM

## 2021-11-01 RX ORDER — DAPAGLIFLOZIN 10 MG/1
TABLET, FILM COATED ORAL
Qty: 90 TABLET | Refills: 4 | Status: SHIPPED | OUTPATIENT
Start: 2021-11-01

## 2022-03-30 ENCOUNTER — CLINICAL SUPPORT NO REQUIREMENTS (OUTPATIENT)
Dept: CARDIOLOGY | Facility: CLINIC | Age: 57
End: 2022-03-30

## 2022-03-30 DIAGNOSIS — I49.5 SICK SINUS SYNDROME: ICD-10-CM

## 2022-03-30 PROCEDURE — 93288 INTERROG EVL PM/LDLS PM IP: CPT | Performed by: INTERNAL MEDICINE

## 2022-04-12 NOTE — PROGRESS NOTES
Dual Chamber Pacemaker Evaluation Report  Clinic Interrogation    April 12, 2022    Primary Cardiologist: Viry  : Medtronic Model: Adapta ADDR01  Implant date: 7/30/10    Reason for evaluation: routine  Indication for pacemaker: sick sinus syndrome    Measurements  Atrial sensing - P wave: 0.7->2.8 mV  Atrial threshold: 0.75 V@ 0.4 ms  Atrial lead impedance: 829 ohms  Ventricular sensing - R wave: 5.6-22.8 mV  Ventricular threshold: 1.0 V @ 0.4 ms  Ventricular lead impedance:   627 ohms     Diagnostic Data  Atrial paced: 13.1 %  Ventricular paced: 0.4 %  Other: 2 episodes of SVT noted.  Longest is 6 seconds.  3 episodes of NSVT noted.  Longest is 5 seconds.  Battery status: intensify follow up   2.74 V      Final Parameters  Mode:  AAIR+  Lower rate: 50 bpm   Upper rate: 130 bpm  AV Delay: paced- 150 ms  Sensed-120 ms  Atrial - Amplitude: 1.5 V   Pulse width: 0.4 ms   Sensitivity: 0.5 mV     Ventricular - Amplitude: 2.0 V  Pulse width: 0.46 ms  Sensitivity: 2.8 mV    Changes made: none  Conclusions: normal pacemaker function, stable pacing and sensing thresholds and adequate battery reserve    Follow up: 3 months in office for battery check

## 2022-05-25 ENCOUNTER — OFFICE VISIT (OUTPATIENT)
Dept: PRIMARY CARE CLINIC | Age: 57
End: 2022-05-25
Payer: COMMERCIAL

## 2022-05-25 VITALS
WEIGHT: 177.6 LBS | HEIGHT: 68 IN | RESPIRATION RATE: 18 BRPM | TEMPERATURE: 97.8 F | SYSTOLIC BLOOD PRESSURE: 140 MMHG | HEART RATE: 94 BPM | DIASTOLIC BLOOD PRESSURE: 90 MMHG | OXYGEN SATURATION: 96 % | BODY MASS INDEX: 26.92 KG/M2

## 2022-05-25 DIAGNOSIS — E78.5 HYPERLIPIDEMIA, UNSPECIFIED HYPERLIPIDEMIA TYPE: ICD-10-CM

## 2022-05-25 DIAGNOSIS — R20.2 NUMBNESS AND TINGLING IN LEFT HAND: ICD-10-CM

## 2022-05-25 DIAGNOSIS — R20.0 NUMBNESS AND TINGLING IN LEFT HAND: ICD-10-CM

## 2022-05-25 DIAGNOSIS — Z13.1 SCREENING FOR DIABETES MELLITUS: ICD-10-CM

## 2022-05-25 DIAGNOSIS — Z13.220 ENCOUNTER FOR SCREENING FOR LIPID DISORDER: ICD-10-CM

## 2022-05-25 DIAGNOSIS — I25.10 CORONARY ARTERY DISEASE INVOLVING NATIVE CORONARY ARTERY OF NATIVE HEART WITHOUT ANGINA PECTORIS: ICD-10-CM

## 2022-05-25 DIAGNOSIS — M54.2 NECK PAIN: ICD-10-CM

## 2022-05-25 DIAGNOSIS — Z12.5 SCREENING PSA (PROSTATE SPECIFIC ANTIGEN): ICD-10-CM

## 2022-05-25 DIAGNOSIS — Z00.00 WELL ADULT EXAM: Primary | ICD-10-CM

## 2022-05-25 DIAGNOSIS — E11.9 TYPE 2 DIABETES MELLITUS WITHOUT COMPLICATION, WITHOUT LONG-TERM CURRENT USE OF INSULIN (HCC): ICD-10-CM

## 2022-05-25 DIAGNOSIS — R53.83 FATIGUE, UNSPECIFIED TYPE: ICD-10-CM

## 2022-05-25 PROBLEM — I47.10 SVT (SUPRAVENTRICULAR TACHYCARDIA): Status: ACTIVE | Noted: 2018-06-08

## 2022-05-25 PROBLEM — G47.33 OBSTRUCTIVE SLEEP APNEA SYNDROME: Status: ACTIVE | Noted: 2022-05-25

## 2022-05-25 PROBLEM — I47.1 SVT (SUPRAVENTRICULAR TACHYCARDIA) (HCC): Status: ACTIVE | Noted: 2018-06-08

## 2022-05-25 PROBLEM — G47.00 INSOMNIA: Status: ACTIVE | Noted: 2019-07-30

## 2022-05-25 PROBLEM — I49.5 SICK SINUS SYNDROME (HCC): Status: ACTIVE | Noted: 2018-06-08

## 2022-05-25 PROBLEM — I10 ESSENTIAL HYPERTENSION: Status: ACTIVE | Noted: 2019-07-30

## 2022-05-25 PROBLEM — E11.65 HYPERGLYCEMIA DUE TO TYPE 2 DIABETES MELLITUS (HCC): Status: ACTIVE | Noted: 2022-05-25

## 2022-05-25 LAB
CREATININE URINE: 64 MG/DL (ref 4.2–622)
MICROALBUMIN UR-MCNC: <1.2 MG/DL (ref 0–19)
MICROALBUMIN/CREAT UR-RTO: NORMAL MG/G

## 2022-05-25 PROCEDURE — 99396 PREV VISIT EST AGE 40-64: CPT | Performed by: NURSE PRACTITIONER

## 2022-05-25 RX ORDER — ZOLPIDEM TARTRATE 10 MG/1
TABLET ORAL
COMMUNITY
Start: 2022-04-26

## 2022-05-25 ASSESSMENT — PATIENT HEALTH QUESTIONNAIRE - PHQ9
SUM OF ALL RESPONSES TO PHQ QUESTIONS 1-9: 0
1. LITTLE INTEREST OR PLEASURE IN DOING THINGS: 0
SUM OF ALL RESPONSES TO PHQ QUESTIONS 1-9: 0
SUM OF ALL RESPONSES TO PHQ9 QUESTIONS 1 & 2: 0
SUM OF ALL RESPONSES TO PHQ QUESTIONS 1-9: 0
2. FEELING DOWN, DEPRESSED OR HOPELESS: 0
SUM OF ALL RESPONSES TO PHQ QUESTIONS 1-9: 0

## 2022-05-25 ASSESSMENT — ENCOUNTER SYMPTOMS
RESPIRATORY NEGATIVE: 1
GASTROINTESTINAL NEGATIVE: 1
EYES NEGATIVE: 1
ALLERGIC/IMMUNOLOGIC NEGATIVE: 1

## 2022-05-25 NOTE — PROGRESS NOTES
[facility] PHYSICIAN SERVICES  [facility]  [address]  [address] [address]  Dept: [phone]  Dept Fax: [phone]  Loc: [phone]    Gagan Simon is a 64 y.o. male who presents today for his medical conditions/complaints as noted below. Gagan Simon is c/o of Annual Exam (Pt is here for a physical. Pt is not fasting. Pt cites several concerns. )        HPI:     HPI   Chief Complaint   Patient presents with    Annual Exam     Pt is here for a physical. Pt is not fasting. Pt cites several concerns. he did not start the testosterone was not covered. His testosterone has been borderline in the last 2 times it has been just above the normal darrick. Insurance will not cover unless it is clinically low. He is on metformin. He is on the lipitor 40mg for cholesterol. He sees Dr Felicia Mathew for his heart . He has been having neck pain under left shoulder down to elbow and numbness in last 2 fingers. It comes and goes. It is not the same as his prior chest pain.    Lab Results   Component Value Date    GLUCOSE 145 03/25/2021    GLUCOSE 147 08/24/2020    GLUCOSE 153 06/19/2019    LABA1C 6.9 03/25/2021    LABA1C 7.6 08/24/2020    LABA1C 6.5 02/22/2019     Past Medical History:   Diagnosis Date    CAD (coronary artery disease)     Chest pain     GERD (gastroesophageal reflux disease)     History of atherosclerotic cardiovascular disease     Hyperlipidemia     Pacemaker     Syncope       Past Surgical History:   Procedure Laterality Date    CORONARY ANGIOPLASTY WITH STENT PLACEMENT      PACEMAKER PLACEMENT      MO COLONOSCOPY W/BIOPSY SINGLE/MULTIPLE N/A 12/4/2017    Dr Carla Severs disease-Tubular AP (-) dysplasia-5 yr recall    MO EGD TRANSORAL BIOPSY SINGLE/MULTIPLE N/A 12/4/2017    Dr AN Rivera-Gastritis       Vitals 5/25/2022 3/25/2021 8/24/2020 5/6/2020 6/19/2019 5/53/5128   SYSTOLIC 479 515 914 - 370 034   DIASTOLIC 90 78 84 - 79 82   Site Left Upper Arm - Left Upper Arm - - -   Position Sitting - Sitting - - -   Cuff Size Medium Adult - Medium Adult - - -   Pulse 94 65 75 54 78 65   Temp 97.8 97.6 97.1 97 98.1 97.5   Resp 18 16 16 - 17 18   SpO2 96 99 98 98 96 98   Weight 177 lb 9.6 oz 181 lb 12.8 oz 187 lb 12.8 oz - 186 lb 190 lb   Height 5' 8\" 5' 8\" - - 5' 8\" 5' 8\"   Body mass index 27 kg/m2 27.64 kg/m2 - - 28.28 kg/m2 28.89 kg/m2   Some recent data might be hidden       History reviewed. No pertinent family history. Social History     Tobacco Use    Smoking status: Never Smoker    Smokeless tobacco: Never Used   Substance Use Topics    Alcohol use: Yes     Comment: rare      Current Outpatient Medications on File Prior to Visit   Medication Sig Dispense Refill    dapagliflozin (FARXIGA) 10 MG tablet TAKE ONE TABLET BY MOUTH EVERY DAY      zolpidem (AMBIEN) 10 MG tablet TAKE ONE TABLET BY MOUTH AT BEDTIME      metFORMIN (GLUCOPHAGE) 500 MG tablet TAKE TWO TABLETS BY MOUTH TWICE DAILY 120 tablet 1    atorvastatin (LIPITOR) 40 MG tablet Take 1 tablet(s) every day by oral route. 90 tablet 3    SYRINGE-NEEDLE, DISP, 3 ML 22G X 1-1/2\" 3 ML MISC Testosterone IM every 2 wks 12 each 3    ticagrelor (BRILINTA) 90 MG TABS tablet Take 60 mg by mouth daily       metoprolol (LOPRESSOR) 25 MG tablet   4    ASPIRIN PO Take 81 mg by mouth daily       Esomeprazole Magnesium (NEXIUM PO) Take  by mouth.  Testosterone 20.25 MG/1.25GM (1.62%) GEL Apply 2 pumps of gel daily for testosterone (Patient not taking: Reported on 5/25/2022) 60 g 0    testosterone cypionate (DEPOTESTOTERONE CYPIONATE) 100 MG/ML injection 1 ml IM every 2 weeks for 8 weeks then recheck level. (Patient not taking: Reported on 5/25/2022) 2 mL 0     No current facility-administered medications on file prior to visit.      No Known Allergies    Health Maintenance   Topic Date Due    COVID-19 Vaccine (1) Never done    Pneumococcal 0-64 years Vaccine (1 - PCV) Never done    HIV screen  Never done    Diabetic retinal exam  Never done    Hepatitis C screen  Never done    Hepatitis B vaccine (1 of 3 - Risk 3-dose series) Never done    Shingles vaccine (1 of 2) Never done    Prostate Specific Antigen (PSA) Screening or Monitoring  08/24/2021    Diabetic foot exam  03/25/2022    A1C test (Diabetic or Prediabetic)  03/25/2022    Lipids  03/25/2022    Flu vaccine (Season Ended) 09/01/2022    Colorectal Cancer Screen  12/04/2022    Diabetic microalbuminuria test  05/25/2023    Depression Screen  05/25/2023    DTaP/Tdap/Td vaccine (2 - Td or Tdap) 06/19/2029    Hepatitis A vaccine  Aged Out    Hib vaccine  Aged Out    Meningococcal (ACWY) vaccine  Aged Out       Subjective:      Review of Systems   Constitutional: Positive for activity change. HENT: Negative. Eyes: Negative. Respiratory: Negative. Cardiovascular: Negative. Gastrointestinal: Negative. Endocrine:        Type 2 diabetes   Genitourinary: Negative. Musculoskeletal: Positive for neck pain. Allergic/Immunologic: Negative. Neurological: Positive for numbness. Hematological: Negative. Psychiatric/Behavioral: Negative. Objective:     Physical Exam  Vitals and nursing note reviewed. Constitutional:       Appearance: Normal appearance. He is well-developed. HENT:      Head: Normocephalic. Right Ear: Tympanic membrane and external ear normal.      Left Ear: Tympanic membrane and external ear normal.      Nose: Nose normal.      Mouth/Throat:      Mouth: Mucous membranes are moist.   Eyes:      Pupils: Pupils are equal, round, and reactive to light. Neck:      Vascular: No carotid bruit. Cardiovascular:      Rate and Rhythm: Normal rate and regular rhythm. Heart sounds: Normal heart sounds. Pulmonary:      Effort: Pulmonary effort is normal.      Breath sounds: Normal breath sounds.    Abdominal:      General: Bowel sounds are normal.   Genitourinary:     Comments: Declined gu exam  Musculoskeletal: Cervical back: Tenderness present. Pain with movement present. Decreased range of motion. Skin:     General: Skin is warm and dry. Capillary Refill: Capillary refill takes less than 2 seconds. Neurological:      General: No focal deficit present. Mental Status: He is alert and oriented to person, place, and time. Psychiatric:         Mood and Affect: Mood normal.         Behavior: Behavior normal.         Thought Content: Thought content normal.         Judgment: Judgment normal.       BP (!) 140/90 (Site: Left Upper Arm, Position: Sitting, Cuff Size: Medium Adult)   Pulse 94   Temp 97.8 °F (36.6 °C) (Temporal)   Resp 18   Ht 5' 8\" (1.727 m)   Wt 177 lb 9.6 oz (80.6 kg)   SpO2 96%   BMI 27.00 kg/m²     Assessment:       Diagnosis Orders   1. Well adult exam  CBC with Auto Differential    Comprehensive Metabolic Panel    Lipid Panel    PSA Screening   2. Type 2 diabetes mellitus without complication, without long-term current use of insulin ([facility])  Microalbumin / Creatinine Urine Ratio    Hemoglobin A1C   3. Coronary artery disease involving native coronary artery of native heart without angina pectoris     4. Hyperlipidemia, unspecified hyperlipidemia type  Lipid Panel   5. Screening PSA (prostate specific antigen)  PSA Screening   6. Neck pain  XR CERVICAL SPINE (2-3 VIEWS)   7. Numbness and tingling in left hand  XR CERVICAL SPINE (2-3 VIEWS)   8. Encounter for screening for lipid disorder     9. Screening for diabetes mellitus     10. Fatigue, unspecified type  Testosterone Free and Total Male         Plan:   XR CERVICAL SPINE (2-3 VIEWS)    Result Date: 5/25/2022  XR CERVICAL SPINE (2-3 VIEWS) 5/25/2022 5:44 PM History: Neck pain. 4 image cervical spine series. Lateral, AP, open-mouth, and swimmer's view obtained. Normal curvature, alignment, and prevertebral soft tissues. No scoliosis. C1-C2 lateral masses align normally. Grossly normal alignment at the cervicothoracic junction.     1. No acute bony abnormality. Signed by Dr Sarmad Daniels    PDMP Monitoring:    Last PDMP Jevon Mckeon as Reviewed [facility]):  Review User Review Instant Review Result            Urine Drug Screenings (1 yr)    No resulted procedures found. Medication Contract and Consent for Opioid Use Documents Filed      No documents found                 Patient given educational materials -see patient instructions. Discussed use, benefit, and side effects of prescribed medications. All patient questions answered. Pt voiced understanding. Reviewed health maintenance. Instructed to continue currentmedications, diet and exercise. Patient agreed with treatment plan. Follow up as directed. MEDICATIONS:  No orders of the defined types were placed in this encounter. ORDERS:  Orders Placed This Encounter   Procedures    XR CERVICAL SPINE (2-3 VIEWS)    Microalbumin / Creatinine Urine Ratio    CBC with Auto Differential    Comprehensive Metabolic Panel    Lipid Panel    PSA Screening    Testosterone Free and Total Male    Hemoglobin A1C       Follow-up:  No follow-ups on file. PATIENT INSTRUCTIONS:  Patient Instructions   Watch blood sugar  May discuss changing lipitor to crestor later. Recheck testosterone. Continue with cardiology. Metformin for sugar  Return for labs fasting. Electronically signed by JA Angeles CNP on 5/26/2022 at 11:37 AM    EMR Dragon/transcription disclaimer:  Much of thisencounter note is electronic transcription/translation of spoken language to printed texts. The electronic translation of spoken language may be erroneous, or at times, nonsensical words or phrases may be inadvertentlytranscribed.   Although I have reviewed the note for such errors, some may still exist.

## 2022-05-25 NOTE — PATIENT INSTRUCTIONS
Watch blood sugar  May discuss changing lipitor to crestor later. Recheck testosterone. Continue with cardiology. Metformin for sugar  Return for labs fasting.

## 2022-05-26 DIAGNOSIS — R53.83 FATIGUE, UNSPECIFIED TYPE: ICD-10-CM

## 2022-05-26 DIAGNOSIS — Z12.5 SCREENING PSA (PROSTATE SPECIFIC ANTIGEN): ICD-10-CM

## 2022-05-26 DIAGNOSIS — Z00.00 WELL ADULT EXAM: ICD-10-CM

## 2022-05-26 DIAGNOSIS — E11.9 TYPE 2 DIABETES MELLITUS WITHOUT COMPLICATION, WITHOUT LONG-TERM CURRENT USE OF INSULIN (HCC): ICD-10-CM

## 2022-05-26 DIAGNOSIS — E78.5 HYPERLIPIDEMIA, UNSPECIFIED HYPERLIPIDEMIA TYPE: ICD-10-CM

## 2022-05-26 LAB
ALBUMIN SERPL-MCNC: 5.3 G/DL (ref 3.5–5.2)
ALP BLD-CCNC: 194 U/L (ref 40–130)
ALT SERPL-CCNC: 42 U/L (ref 5–41)
ANION GAP SERPL CALCULATED.3IONS-SCNC: 12 MMOL/L (ref 7–19)
AST SERPL-CCNC: 21 U/L (ref 5–40)
BASOPHILS ABSOLUTE: 0.1 K/UL (ref 0–0.2)
BASOPHILS RELATIVE PERCENT: 0.7 % (ref 0–1)
BILIRUB SERPL-MCNC: 1.9 MG/DL (ref 0.2–1.2)
BUN BLDV-MCNC: 18 MG/DL (ref 6–20)
CALCIUM SERPL-MCNC: 10 MG/DL (ref 8.6–10)
CHLORIDE BLD-SCNC: 103 MMOL/L (ref 98–111)
CHOLESTEROL, TOTAL: 227 MG/DL (ref 160–199)
CO2: 28 MMOL/L (ref 22–29)
CREAT SERPL-MCNC: 0.7 MG/DL (ref 0.5–1.2)
EOSINOPHILS ABSOLUTE: 0.2 K/UL (ref 0–0.6)
EOSINOPHILS RELATIVE PERCENT: 2.4 % (ref 0–5)
GFR AFRICAN AMERICAN: >59
GFR NON-AFRICAN AMERICAN: >60
GLUCOSE BLD-MCNC: 135 MG/DL (ref 74–109)
HBA1C MFR BLD: 6.6 % (ref 4–6)
HCT VFR BLD CALC: 53.6 % (ref 42–52)
HDLC SERPL-MCNC: 42 MG/DL (ref 55–121)
HEMOGLOBIN: 17.5 G/DL (ref 14–18)
IMMATURE GRANULOCYTES #: 0 K/UL
LDL CHOLESTEROL CALCULATED: ABNORMAL MG/DL
LDL CHOLESTEROL DIRECT: 125 MG/DL
LYMPHOCYTES ABSOLUTE: 1 K/UL (ref 1.1–4.5)
LYMPHOCYTES RELATIVE PERCENT: 14.4 % (ref 20–40)
MCH RBC QN AUTO: 28.2 PG (ref 27–31)
MCHC RBC AUTO-ENTMCNC: 32.6 G/DL (ref 33–37)
MCV RBC AUTO: 86.5 FL (ref 80–94)
MONOCYTES ABSOLUTE: 0.5 K/UL (ref 0–0.9)
MONOCYTES RELATIVE PERCENT: 6.6 % (ref 0–10)
NEUTROPHILS ABSOLUTE: 5.4 K/UL (ref 1.5–7.5)
NEUTROPHILS RELATIVE PERCENT: 75.6 % (ref 50–65)
PDW BLD-RTO: 13.7 % (ref 11.5–14.5)
PLATELET # BLD: 200 K/UL (ref 130–400)
PMV BLD AUTO: 10.3 FL (ref 9.4–12.4)
POTASSIUM SERPL-SCNC: 4.5 MMOL/L (ref 3.5–5)
PROSTATE SPECIFIC ANTIGEN: 3.47 NG/ML (ref 0–4)
RBC # BLD: 6.2 M/UL (ref 4.7–6.1)
SODIUM BLD-SCNC: 143 MMOL/L (ref 136–145)
TOTAL PROTEIN: 7.8 G/DL (ref 6.6–8.7)
TRIGL SERPL-MCNC: 453 MG/DL (ref 0–149)
WBC # BLD: 7.1 K/UL (ref 4.8–10.8)

## 2022-06-01 LAB
SEX HORMONE BINDING GLOBULIN: 24 NMOL/L (ref 11–80)
TESTOSTERONE FREE-NONMALE: 56.8 PG/ML (ref 47–244)
TESTOSTERONE TOTAL: 247 NG/DL (ref 220–1000)

## 2022-06-02 RX ORDER — SULFAMETHOXAZOLE AND TRIMETHOPRIM 800; 160 MG/1; MG/1
1 TABLET ORAL 2 TIMES DAILY
Qty: 14 TABLET | Refills: 0 | Status: SHIPPED | OUTPATIENT
Start: 2022-06-02 | End: 2022-06-09

## 2022-07-01 ENCOUNTER — CLINICAL SUPPORT NO REQUIREMENTS (OUTPATIENT)
Dept: CARDIOLOGY | Facility: CLINIC | Age: 57
End: 2022-07-01

## 2022-07-01 DIAGNOSIS — I49.5 SICK SINUS SYNDROME: ICD-10-CM

## 2022-07-01 PROCEDURE — 93288 INTERROG EVL PM/LDLS PM IP: CPT | Performed by: INTERNAL MEDICINE

## 2022-07-03 NOTE — PROGRESS NOTES
Dual Chamber Pacemaker Evaluation Report  Clinic Interrogation    July 2, 2022    Primary Cardiologist: Viry  : Medtronic Model: ADDR01  Implant date: 7/30/10    Reason for evaluation: routine  Indication for pacemaker: sick sinus syndrome    Measurements  Atrial sensing - P wave: 0.7-2.8 mV  Atrial threshold: 0.875 V@ 0.4 ms  Atrial lead impedance: 800 ohms  Ventricular sensing - R wave: 5.6-22.4 mV  Ventricular threshold: 0.875 V @ 0.4 ms  Ventricular lead impedance:   608 ohms     Diagnostic Data  Atrial paced: 14.9 %  Ventricular paced: 0.4 %  Other: No new episodes noted.  Battery status: intensify follow up   2.74V      Final Parameters  Mode:  AAIR+  Lower rate: 50 bpm   Upper rate: 130 bpm  AV Delay: paced- 150 ms  Sensed-120 ms  Atrial - Amplitude: 1.75 V   Pulse width: 0.4 ms   Sensitivity: 0.5 mV     Ventricular - Amplitude: 2.0 V  Pulse width: 0.46 ms  Sensitivity: 2.8 mV    Changes made: none  Conclusions: normal pacemaker function, stable pacing and sensing thresholds and adequate battery reserve    Follow up: 3 months in office for battery check

## 2022-08-11 RX ORDER — ATORVASTATIN CALCIUM 40 MG/1
TABLET, FILM COATED ORAL
Qty: 90 TABLET | Refills: 1 | Status: SHIPPED | OUTPATIENT
Start: 2022-08-11

## 2022-09-29 ENCOUNTER — CLINICAL SUPPORT NO REQUIREMENTS (OUTPATIENT)
Dept: CARDIOLOGY | Facility: CLINIC | Age: 57
End: 2022-09-29

## 2022-09-29 ENCOUNTER — OFFICE VISIT (OUTPATIENT)
Dept: CARDIOLOGY | Facility: CLINIC | Age: 57
End: 2022-09-29

## 2022-09-29 VITALS
WEIGHT: 176 LBS | HEIGHT: 68 IN | HEART RATE: 63 BPM | OXYGEN SATURATION: 99 % | SYSTOLIC BLOOD PRESSURE: 116 MMHG | BODY MASS INDEX: 26.67 KG/M2 | DIASTOLIC BLOOD PRESSURE: 80 MMHG

## 2022-09-29 DIAGNOSIS — I49.5 SICK SINUS SYNDROME: ICD-10-CM

## 2022-09-29 DIAGNOSIS — I25.10 ATHEROSCLEROSIS OF NATIVE CORONARY ARTERY OF NATIVE HEART WITHOUT ANGINA PECTORIS: Primary | ICD-10-CM

## 2022-09-29 DIAGNOSIS — E78.2 MIXED HYPERLIPIDEMIA: ICD-10-CM

## 2022-09-29 PROCEDURE — 93288 INTERROG EVL PM/LDLS PM IP: CPT | Performed by: INTERNAL MEDICINE

## 2022-09-29 PROCEDURE — 93000 ELECTROCARDIOGRAM COMPLETE: CPT | Performed by: INTERNAL MEDICINE

## 2022-09-29 PROCEDURE — 99214 OFFICE O/P EST MOD 30 MIN: CPT | Performed by: INTERNAL MEDICINE

## 2022-09-29 RX ORDER — ZOLPIDEM TARTRATE 5 MG/1
5 TABLET ORAL NIGHTLY PRN
COMMUNITY

## 2022-09-29 NOTE — PROGRESS NOTES
Subjective:     Encounter Date:09/29/2022      Patient ID: Fred Atkins is a 57 y.o. male with coronary artery disease, status post previous PCI to LAD and RCA and more recently a PCI to the RCA (while in [place]), pacemaker in place for sick sinus syndrome (by Dr. Cam), here for follow-up.    Chief Complaint: Here today for follow-up of coronary artery disease    History of Present Illness    This patient presents today for routine follow-up.  He says that he has been doing well and feeling well.  He denies having any significant chest pain, shortness of breath, dyspnea on exertion.  He remains relatively active and has noticed no limitations to activities.  He remains on aspirin and Brilinta.  No significant bleeding issues.  He denies having palpitations, lightheadedness, dizziness or syncope.  Blood pressure is generally well controlled.  His most recent lipid panel showed LDL cholesterol to be quite a bit elevated over previous values.  The patient says that he has not been as compliant with his statin therapy as he has in the past but otherwise has been compliant with the rest of his medications.  No side effects from statin therapy, however.  Otherwise, the patient does have a pacemaker that has been appropriately functioning.  He says that otherwise he seems to be doing well at this time.    Coronary Artery Disease  Presents for follow-up visit. Pertinent negatives include no chest pain, dizziness, leg swelling, palpitations or shortness of breath. The symptoms have been stable.       Current Outpatient Medications:   •  aspirin 81 MG EC tablet, Take 81 mg by mouth Daily., Disp: , Rfl:   •  atorvastatin (LIPITOR) 40 MG tablet, Take 40 mg by mouth Every Night., Disp: , Rfl:   •  Farxiga 10 MG tablet, TAKE ONE TABLET BY MOUTH EVERY DAY, Disp: 90 tablet, Rfl: 4  •  metFORMIN (GLUCOPHAGE) 500 MG tablet, Take 500 mg by mouth 2 (Two) Times a Day With Meals., Disp: , Rfl:   •  metoprolol tartrate  (LOPRESSOR) 25 MG tablet, Take 1 tablet by mouth Daily., Disp: 90 tablet, Rfl: 3  •  nitroglycerin (NITROSTAT) 0.4 MG SL tablet, Place 0.4 mg under the tongue As Needed., Disp: , Rfl: 2  •  omeprazole (priLOSEC) 20 MG capsule, Take 20 mg by mouth Daily As Needed., Disp: , Rfl:   •  ticagrelor (BRILINTA) 60 MG tablet tablet, Take 1 tablet by mouth 2 (Two) Times a Day., Disp: 180 tablet, Rfl: 3  •  zolpidem (AMBIEN) 5 MG tablet, Take 5 mg by mouth At Night As Needed for Sleep., Disp: , Rfl:     No Known Allergies    Social History     Tobacco Use   • Smoking status: Never Smoker   • Smokeless tobacco: Never Used   Substance Use Topics   • Alcohol use: Yes     Comment: OCCASIONALLY     Review of Systems   Constitutional: Negative for fever and weight loss.   Cardiovascular: Negative for chest pain, dyspnea on exertion, leg swelling, orthopnea, palpitations, paroxysmal nocturnal dyspnea and syncope.   Respiratory: Negative for cough, shortness of breath and wheezing.    Hematologic/Lymphatic: Negative for adenopathy and bleeding problem.   Gastrointestinal: Negative for abdominal pain, nausea and vomiting.   Neurological: Negative for dizziness, headaches and loss of balance.       ECG 12 Lead    Date/Time: 9/29/2022 10:23 AM  Performed by: Derek Baires MD  Authorized by: Derek Baires MD   Comparison: compared with previous ECG from 9/29/2021  Similar to previous ECG  Rhythm: sinus rhythm  Rate: normal  BPM: 63  Conduction: conduction normal  QRS axis: right  Other findings: non-specific ST-T wave changes    Clinical impression: abnormal EKG             Objective:     Vitals reviewed.   Constitutional:       General: Not in acute distress.     Appearance: Well-developed and not in distress.   Eyes:      Extraocular Movements: Extraocular movements intact.   HENT:      Head: Normocephalic and atraumatic.   Neck:      Thyroid: No thyroid mass.      Vascular: No JVD.   Pulmonary:      Effort:  "Pulmonary effort is normal.      Breath sounds: Normal breath sounds. No wheezing. No rhonchi. No rales.   Cardiovascular:      Normal rate. Regular rhythm.      Murmurs: There is no murmur.      No gallop.   Pulses:     Intact distal pulses.   Edema:     Peripheral edema absent.   Abdominal:      General: Bowel sounds are normal. There is no distension.      Palpations: Abdomen is soft.      Tenderness: There is no abdominal tenderness.   Skin:     General: Skin is warm and dry. There is no cyanosis.      Findings: No erythema or rash.   Neurological:      Mental Status: Alert and oriented to person, place, and time.      Cranial Nerves: No cranial nerve deficit.       /80   Pulse 63   Ht 172.7 cm (68\")   Wt 79.8 kg (176 lb)   SpO2 99%   BMI 26.76 kg/m²     Data/Lab Review:     Most recent pacemaker interrogation from 7/2/2022: Medtronic pacemaker placed with an indication of sick sinus syndrome.  Normal sensing, threshold, and impedance.  Atrial pacing approximately 15%, less than 1% ventricular pacing.    Lab Results   Component Value Date    CHLPL 227 (H) 05/26/2022    TRIG 453 (H) 05/26/2022    HDL 42 (L) 05/26/2022    LDL see below 05/26/2022     05/26/2022         Assessment:          Diagnosis Plan   1. Atherosclerosis of native coronary artery of native heart without angina pectoris  ECG 12 Lead   2. Sick sinus syndrome (HCC)     3. Mixed hyperlipidemia          Plan:       1.  Coronary artery disease: The patient remains clinically stable at this time.  He remains on aspirin and the lower dose of Brilinta and is tolerating these therapies well.  He also remains on beta-blocker and statin therapy along with Farxiga.  No changes recommended at this time as the patient is asymptomatic.    2.  Sick sinus syndrome: Stable device function on most recent interrogation.  The patient is due for a pacemaker interrogation prior to leaving today.    3.  Mixed hyperlipidemia: The patient notes being " not as compliant with statin therapy as it was in the past.  I have encouraged him to resume daily dosing of Lipitor at previous dose as his LDL cholesterol had previously been reasonably well controlled.    The patient will follow-up in 12 months unless otherwise needed sooner.

## 2022-10-05 NOTE — PROGRESS NOTES
Dual Chamber Pacemaker Evaluation Report  Clinic Interrogation    October 5, 2022    Primary Cardiologist: Viry  : Medtronic Model: [id number]  Implant date: 7/30/10    Reason for evaluation: routine  Indication for pacemaker: sick sinus syndrome    Measurements  Atrial sensing - P wave: 0.7->2.8 mV  Atrial threshold: 0.875 V@ 0.4 ms  Atrial lead impedance: 800 ohms  Ventricular sensing - R wave: 5.6-22.4 mV  Ventricular threshold: 0.875 V @ 0.4 ms  Ventricular lead impedance:   608 ohms     Diagnostic Data  Atrial paced: 14.9 %  Ventricular paced: 0.4 %  Other: no new episodes noted  Battery status: intensify follow up, 2.74 V      Final Parameters  Mode:  AAIR+  Lower rate: 50 bpm   Upper rate: 130 bpm  AV Delay: paced- 150 ms  Sensed-120 ms  Atrial - Amplitude: 1.75 V   Pulse width: 0.4 ms   Sensitivity: 0.5 mV     Ventricular - Amplitude: 2.0 V  Pulse width: 0.46 ms  Sensitivity: 2.8 mV    Changes made: none  Conclusions: normal pacemaker function, stable pacing and sensing thresholds and adequate battery reserve    Follow up: 3 months in office for battery check

## 2022-11-07 RX ORDER — ATORVASTATIN CALCIUM 40 MG/1
TABLET, FILM COATED ORAL
Qty: 90 TABLET | Refills: 1 | Status: SHIPPED | OUTPATIENT
Start: 2022-11-07

## 2022-12-28 DIAGNOSIS — Z12.11 SCREENING FOR MALIGNANT NEOPLASM OF COLON: Primary | ICD-10-CM

## 2023-01-03 ENCOUNTER — CLINICAL SUPPORT NO REQUIREMENTS (OUTPATIENT)
Dept: CARDIOLOGY | Facility: CLINIC | Age: 58
End: 2023-01-03
Payer: COMMERCIAL

## 2023-01-03 DIAGNOSIS — I49.5 SICK SINUS SYNDROME: ICD-10-CM

## 2023-01-03 DIAGNOSIS — Z95.0 PACEMAKER: Primary | ICD-10-CM

## 2023-01-30 ENCOUNTER — TELEPHONE (OUTPATIENT)
Dept: CARDIOLOGY | Facility: CLINIC | Age: 58
End: 2023-01-30
Payer: COMMERCIAL

## 2023-01-30 NOTE — TELEPHONE ENCOUNTER
Patient is having colonoscopy on 2/17/2023 and they are requesting he hold his Brilinta and aspirin. Please advise.

## 2023-02-01 ENCOUNTER — TELEPHONE (OUTPATIENT)
Dept: GASTROENTEROLOGY | Age: 58
End: 2023-02-01

## 2023-02-01 NOTE — TELEPHONE ENCOUNTER
Patient: Jose Quintana    YOB: 1965      Clearance was received on February 1, 2023. for Endoscopy / Colonoscopy scheduled for: 2/17/23    Patient may discontinue the use of BRILINTA for 5  days prior to the procedure.   CONTINUE TAKING ASA    IS Lovenox required:  NO    PATIENT NOTIFIED ON:  2/1/23 - PATIENT VOICED UNDERSTANDING      Clearance scanned into media

## 2023-02-09 ENCOUNTER — TELEPHONE (OUTPATIENT)
Dept: GASTROENTEROLOGY | Age: 58
End: 2023-02-09

## 2023-02-09 NOTE — TELEPHONE ENCOUNTER
Patient called to reschedule colonoscopy. Patient stated he may have covid. Please return call to advise.

## 2023-03-02 ENCOUNTER — CLINICAL SUPPORT NO REQUIREMENTS (OUTPATIENT)
Dept: CARDIOLOGY | Facility: CLINIC | Age: 58
End: 2023-03-02
Payer: COMMERCIAL

## 2023-03-02 DIAGNOSIS — I49.5 SICK SINUS SYNDROME: ICD-10-CM

## 2023-03-02 DIAGNOSIS — Z95.0 PACEMAKER: Primary | ICD-10-CM

## 2023-03-17 ENCOUNTER — HOSPITAL ENCOUNTER (OUTPATIENT)
Age: 58
Setting detail: OUTPATIENT SURGERY
Discharge: HOME | End: 2023-03-17
Attending: INTERNAL MEDICINE | Admitting: INTERNAL MEDICINE
Payer: COMMERCIAL

## 2023-03-17 ENCOUNTER — ANESTHESIA EVENT (OUTPATIENT)
Dept: ENDOSCOPY | Age: 58
End: 2023-03-17
Payer: COMMERCIAL

## 2023-03-17 ENCOUNTER — ANESTHESIA (OUTPATIENT)
Dept: ENDOSCOPY | Age: 58
End: 2023-03-17
Payer: COMMERCIAL

## 2023-03-17 VITALS
SYSTOLIC BLOOD PRESSURE: 114 MMHG | WEIGHT: 177 LBS | RESPIRATION RATE: 16 BRPM | TEMPERATURE: 97 F | HEIGHT: 68 IN | OXYGEN SATURATION: 97 % | BODY MASS INDEX: 26.83 KG/M2 | DIASTOLIC BLOOD PRESSURE: 78 MMHG | HEART RATE: 64 BPM

## 2023-03-17 LAB
GLUCOSE BLD-MCNC: 139 MG/DL (ref 70–99)
PERFORMED ON: ABNORMAL

## 2023-03-17 PROCEDURE — 3700000001 HC ADD 15 MINUTES (ANESTHESIA): Performed by: INTERNAL MEDICINE

## 2023-03-17 PROCEDURE — 2500000003 HC RX 250 WO HCPCS: Performed by: NURSE ANESTHETIST, CERTIFIED REGISTERED

## 2023-03-17 PROCEDURE — 7100000011 HC PHASE II RECOVERY - ADDTL 15 MIN: Performed by: INTERNAL MEDICINE

## 2023-03-17 PROCEDURE — 7100000010 HC PHASE II RECOVERY - FIRST 15 MIN: Performed by: INTERNAL MEDICINE

## 2023-03-17 PROCEDURE — 2709999900 HC NON-CHARGEABLE SUPPLY: Performed by: INTERNAL MEDICINE

## 2023-03-17 PROCEDURE — 3609027000 HC COLONOSCOPY: Performed by: INTERNAL MEDICINE

## 2023-03-17 PROCEDURE — 82962 GLUCOSE BLOOD TEST: CPT

## 2023-03-17 PROCEDURE — 2580000003 HC RX 258: Performed by: INTERNAL MEDICINE

## 2023-03-17 PROCEDURE — 3700000000 HC ANESTHESIA ATTENDED CARE: Performed by: INTERNAL MEDICINE

## 2023-03-17 PROCEDURE — 6360000002 HC RX W HCPCS: Performed by: NURSE ANESTHETIST, CERTIFIED REGISTERED

## 2023-03-17 RX ORDER — LIDOCAINE HYDROCHLORIDE 10 MG/ML
INJECTION, SOLUTION EPIDURAL; INFILTRATION; INTRACAUDAL; PERINEURAL PRN
Status: DISCONTINUED | OUTPATIENT
Start: 2023-03-17 | End: 2023-03-17 | Stop reason: SDUPTHER

## 2023-03-17 RX ORDER — SODIUM CHLORIDE, SODIUM LACTATE, POTASSIUM CHLORIDE, CALCIUM CHLORIDE 600; 310; 30; 20 MG/100ML; MG/100ML; MG/100ML; MG/100ML
INJECTION, SOLUTION INTRAVENOUS CONTINUOUS
Status: DISCONTINUED | OUTPATIENT
Start: 2023-03-17 | End: 2023-03-17 | Stop reason: HOSPADM

## 2023-03-17 RX ORDER — PROPOFOL 10 MG/ML
INJECTION, EMULSION INTRAVENOUS PRN
Status: DISCONTINUED | OUTPATIENT
Start: 2023-03-17 | End: 2023-03-17 | Stop reason: SDUPTHER

## 2023-03-17 RX ADMIN — LIDOCAINE HYDROCHLORIDE 25 MG: 10 INJECTION, SOLUTION EPIDURAL; INFILTRATION; INTRACAUDAL; PERINEURAL at 10:30

## 2023-03-17 RX ADMIN — SODIUM CHLORIDE, POTASSIUM CHLORIDE, SODIUM LACTATE AND CALCIUM CHLORIDE: 600; 310; 30; 20 INJECTION, SOLUTION INTRAVENOUS at 10:08

## 2023-03-17 RX ADMIN — PROPOFOL 300 MG: 10 INJECTION, EMULSION INTRAVENOUS at 10:30

## 2023-03-17 ASSESSMENT — PAIN - FUNCTIONAL ASSESSMENT: PAIN_FUNCTIONAL_ASSESSMENT: 0-10

## 2023-03-17 NOTE — OP NOTE
Patient: Alex Tilley : 1965  Med Rec#: [id number] Acc#: [id number]   Primary Care Provider JA Johnson CNP    Date of Procedure:  3/17/2023    Endoscopist: Nivia Pace MD    Referring Provider: JA Johnson CNP,     Operation Performed: Colonoscopy     Indications: screening    Anesthesia:  Sedation was administered by anesthesia who monitored the patient during the procedure. I met with Alex Tilley prior to procedure. We discussed the procedure itself, and I have discussed the risks of endoscopy (including-- but not limited to-- pain, discomfort, bleeding potentially requiring second endoscopic procedure and/or blood transfusion, organ perforation requiring operative repair, damage to organs near the colon, infection, aspiration, cardiopulmonary/allergic reaction), benefits, indications to endoscopy. Additionally, we discussed options other than colonoscopy. The patient expressed understanding. All questions answered. The patient decided to proceed with the procedure. Signed informed consent was placed on the chart. Blood Loss: minimal    Withdrawal time: > 6minute  Bowel Prep: adequate     Complications: no immediate complications    DESCRIPTION OF PROCEDURE:     A time out was performed. After written informed consent was obtained, the patient was placed in the left lateral position. The perianal area was inspected, and a digital rectal exam was performed. A rectal exam was performed: normal tone, no palpable lesions. At this point, a forward viewing Olympus colonoscope was inserted into the anus and carefully advanced to the cecum. The cecum was identified by the ileocecal valve and the appendiceal orifice. The colonoscope was then slowly withdrawn with careful inspection of the mucosa in a linear and circumferential fashion. The scope was retroflexed in the rectum. Suction was utilized during the procedure to remove as much air as possible from the bowel. The colonoscope was removed from the patient, and the procedure was terminated. Findings are listed below. Findings: The mucosa appeared normal throughout the entire examined colon     Retroflexion in the rectum was normal and revealed no further abnormalities         Recommendations:  1. Repeat colonoscopy - max of 5 yrs for screening due to hx of polyps  2. Await biopsy results. Findings and recommendations were discussed w/ the patient. A copy of the images was provided.     Karmen Thorpe MD  3/17/2023  10:42 AM

## 2023-03-17 NOTE — DISCHARGE INSTRUCTIONS
Recommendations:  1. Repeat colonoscopy - max of 5 yrs for screening due to hx of polyps  2. Await biopsy results. Colonoscopy: What to Expect at [address] [facility]  After a colonoscopy, you'll stay at the clinic until you wake up. Then you can go home. But you'll need to arrange for a ride. Your doctor will tell you when you can eat and do your other usual activities. Your doctor will talk to you about when you'll need your next colonoscopy. Your doctor can help you decide how often you need to be checked. This will depend on the results of your test and your risk for colorectal cancer. After the test, you may be bloated or have gas pains. You may need to pass gas. If a biopsy was done or a polyp was removed, you may have streaks of blood in your stool (feces) for a few days. Problems such as heavy rectal bleeding may not occur until several weeks after the test. This isn't common. But it can happen after polyps are removed. This care sheet gives you a general idea about how long it will take for you to recover. But each person recovers at a different pace. Follow the steps below to get better as quickly as possible. How can you care for yourself at home? Activity    Rest when you feel tired. You can do your normal activities when it feels okay to do so. Diet    Follow your doctor's directions for eating. Unless your doctor has told you not to, drink plenty of fluids. This helps to replace the fluids that were lost during the colon prep. Do not drink alcohol. Medicines    Your doctor will tell you if and when you can restart your medicines. You will also be given instructions about taking any new medicines. If you stopped taking aspirin or some other blood thinner, your doctor will tell you when to start taking it again. If polyps were removed or a biopsy was done during the test, your doctor may tell you not to take aspirin or other anti-inflammatory medicines for a few days. These include ibuprofen (Advil, Motrin) and naproxen (Aleve).   Other instructions    For your safety, do not drive or operate machinery until the medicine wears off and you can think clearly. Your doctor may tell you not to drive or operate machinery until the day after your test.     Do not sign legal documents or make major decisions until the medicine wears off and you can think clearly. The anesthesia can make it hard for you to fully understand what you are agreeing to.   Follow-up care is a key part of your treatment and safety. Be sure to make and go to all appointments, and call your doctor if you are having problems. It's also a good idea to know your test results and keep a list of the medicines you take.  When should you call for help?   Call 911 anytime you think you may need emergency care. For example, call if:    You passed out (lost consciousness).     You pass maroon or bloody stools.     You have trouble breathing.   Call your doctor now or seek immediate medical care if:    You have pain that does not get better after you take pain medicine.     You are sick to your stomach or cannot drink fluids.     You have new or worse belly pain.     You have blood in your stools.     You have a fever.     You cannot pass stools or gas.   Watch closely for changes in your health, and be sure to contact your doctor if you have any problems.  Where can you learn more?  Go to https://www.Hip[employer].net/patientEd and enter E264 to learn more about \"Colonoscopy: What to Expect at Home.\"  Current as of: May 4, 2022               Content Version: 13.6  © 3985-9303 [employer].   Care instructions adapted under license by [employer]. If you have questions about a medical condition or this instruction, always ask your healthcare professional. [employer] disclaims any warranty or liability for your use of this information.

## 2023-03-17 NOTE — ANESTHESIA PRE PROCEDURE
Department of Anesthesiology  Preprocedure Note       Name:  Edwardo Bran   Age:  62 y.o.  :  1965                                          MRN:  [id number]         Date:  3/17/2023      Surgeon: Tyesha Tipton):  Venita Fernandes MD    Procedure: Procedure(s):  COLORECTAL CANCER SCREENING, NOT HIGH RISK    Medications prior to admission:   Prior to Admission medications    Medication Sig Start Date End Date Taking? Authorizing Provider   atorvastatin (LIPITOR) 40 MG tablet TAKE ONE TABLET BY MOUTH EVERY DAY 22   Alivia Ontiveros MD   metFORMIN (GLUCOPHAGE) 500 MG tablet TAKE TWO TABLETS BY MOUTH TWICE DAILY 10/3/22   Emre Rater, APRN - CNP   dapagliflozin (FARXIGA) 10 MG tablet TAKE ONE TABLET BY MOUTH EVERY DAY 21   Historical Provider, MD   zolpidem (AMBIEN) 10 MG tablet TAKE ONE TABLET BY MOUTH AT BEDTIME 22   Historical Provider, MD   Testosterone 20.25 MG/1.25GM (1.62%) GEL Apply 2 pumps of gel daily for testosterone  Patient not taking: Reported on 2022 3/25/21 4/25/21  Emre Rater, APRN - CNP   testosterone cypionate (DEPOTESTOTERONE CYPIONATE) 100 MG/ML injection 1 ml IM every 2 weeks for 8 weeks then recheck level. Patient not taking: Reported on 2022  Emre Rater, APRN - CNP   SYRINGE-NEEDLE, DISP, 3 ML 22G X 1-1/2\" 3 ML MISC Testosterone IM every 2 wks 20   Emre Rater, APRN - CNP   ticagrelor (BRILINTA) 90 MG TABS tablet Take 60 mg by mouth daily     Historical Provider, MD   metoprolol (LOPRESSOR) 25 MG tablet  3/2/16   Historical Provider, MD   ASPIRIN PO Take 81 mg by mouth daily     Historical Provider, MD   Esomeprazole Magnesium (NEXIUM PO) Take  by mouth.       Historical Provider, MD       Current medications:    Current Facility-Administered Medications   Medication Dose Route Frequency Provider Last Rate Last Admin    lactated ringers IV soln infusion   IntraVENous Continuous Venita Fernandes MD           Allergies:  No Known Allergies    Problem List:    Patient Active Problem List   Diagnosis Code    CAD (coronary artery disease) I25.10    Chest pain R07.9    Pacemaker Z95.0    Hyperlipidemia E78.5    Syncope R55    GERD (gastroesophageal reflux disease) K21.9    History of atherosclerotic cardiovascular disease Z86.79    Diabetes mellitus ([facility] [address] E11.9    Essential hypertension I10    Hyperglycemia due to type 2 diabetes mellitus ([facility] [address] E11.65    Insomnia G47.00    Sick sinus syndrome (HCC) I49.5    Obstructive sleep apnea syndrome G47.33    SVT (supraventricular tachycardia) ([facility]) I47.1       Past Medical History:        Diagnosis Date    CAD (coronary artery disease)     Chest pain     Diabetes mellitus (HCC)     GERD (gastroesophageal reflux disease)     History of atherosclerotic cardiovascular disease     Hyperlipidemia     Hypertension     Pacemaker     Syncope        Past Surgical History:        Procedure Laterality Date    CORONARY ANGIOPLASTY WITH STENT PLACEMENT      PACEMAKER PLACEMENT      MT COLONOSCOPY W/BIOPSY SINGLE/MULTIPLE N/A 12/4/2017    Dr AN Rivera-diverticular disease-Tubular AP (-) dysplasia-5 yr recall    MT EGD TRANSORAL BIOPSY SINGLE/MULTIPLE N/A 12/4/2017    Dr Reynaldo Garcia       Social History:    Social History     Tobacco Use    Smoking status: Never    Smokeless tobacco: Never   Substance Use Topics    Alcohol use: Yes     Comment: rare                                Counseling given: Not Answered      Vital Signs (Current):   Vitals:    03/17/23 0949   BP: 128/79   Pulse: 67   Resp: 23   Temp: 96.9 °F (36.1 °C)   TempSrc: Temporal   SpO2: 100%   Weight: 177 lb (80.3 kg)   Height: 5' 8\" (1.727 m)                                              BP Readings from Last 3 Encounters:   03/17/23 128/79   05/25/22 (!) 140/90   03/25/21 130/78       NPO Status: Time of last liquid consumption: 0745                        Time of last solid consumption: 1700 Date of last liquid consumption: 03/17/23                        Date of last solid food consumption: 03/15/23    BMI:   Wt Readings from Last 3 Encounters:   03/17/23 177 lb (80.3 kg)   05/25/22 177 lb 9.6 oz (80.6 kg)   03/25/21 181 lb 12.8 oz (82.5 kg)     Body mass index is 26.91 kg/m². CBC:   Lab Results   Component Value Date/Time    WBC 7.1 05/26/2022 08:07 AM    RBC 6.20 05/26/2022 08:07 AM    HGB 17.5 05/26/2022 08:07 AM    HCT 53.6 05/26/2022 08:07 AM    MCV 86.5 05/26/2022 08:07 AM    RDW 13.7 05/26/2022 08:07 AM     05/26/2022 08:07 AM       CMP:   Lab Results   Component Value Date/Time     05/26/2022 08:07 AM    K 4.5 05/26/2022 08:07 AM    K 4.1 06/19/2019 08:55 PM     05/26/2022 08:07 AM    CO2 28 05/26/2022 08:07 AM    BUN 18 05/26/2022 08:07 AM    CREATININE 0.7 05/26/2022 08:07 AM    GFRAA >59 05/26/2022 08:07 AM    LABGLOM >60 05/26/2022 08:07 AM    GLUCOSE 135 05/26/2022 08:07 AM    PROT 7.8 05/26/2022 08:07 AM    CALCIUM 10.0 05/26/2022 08:07 AM    BILITOT 1.9 05/26/2022 08:07 AM    ALKPHOS 194 05/26/2022 08:07 AM    AST 21 05/26/2022 08:07 AM    ALT 42 05/26/2022 08:07 AM       POC Tests: No results for input(s): POCGLU, POCNA, POCK, POCCL, POCBUN, POCHEMO, POCHCT in the last 72 hours.     Coags: No results found for: PROTIME, INR, APTT    HCG (If Applicable): No results found for: PREGTESTUR, PREGSERUM, HCG, HCGQUANT     ABGs: No results found for: PHART, PO2ART, LJR6XTQ, ILW5FLK, BEART, U2OQXQKT     Type & Screen (If Applicable):  No results found for: LABABO, LABRH    Drug/Infectious Status (If Applicable):  No results found for: HIV, HEPCAB    COVID-19 Screening (If Applicable):   Lab Results   Component Value Date/Time    COVID19 Not Detected 05/06/2020 09:50 AM           Anesthesia Evaluation  Patient summary reviewed  Airway: Mallampati: I  TM distance: >3 FB   Neck ROM: full  Mouth opening: > = 3 FB   Dental:          Pulmonary: Cardiovascular:             Beta Blocker:  Dose within 24 Hrs         Neuro/Psych:   Negative Neuro/Psych ROS              GI/Hepatic/Renal:             Endo/Other:              Pt had no PAT visit       Abdominal:             Vascular: negative vascular ROS. Other Findings:           Anesthesia Plan      general and TIVA     ASA 3       Induction: intravenous. Anesthetic plan and risks discussed with patient. Use of blood products discussed with patient whom.                      JA Dailey - CRNA   3/17/2023

## 2023-03-17 NOTE — ANESTHESIA POSTPROCEDURE EVALUATION
Department of Anesthesiology  Postprocedure Note    Patient: Dariana Ford  MRN: [id number]  YOB: 1965  Date of evaluation: 3/17/2023      Procedure Summary     Date: 03/17/23 Room / Location: [address]    Anesthesia Start: 1025 Anesthesia Stop:     Procedure: COLORECTAL CANCER SCREENING, NOT HIGH RISK Diagnosis:       FH: colon cancer      (FH: colon cancer [Z80.0])    Surgeons: Leydi Yanez MD Responsible Provider: JA Torres CRNA    Anesthesia Type: general, TIVA ASA Status: 3          Anesthesia Type: No value filed.     Monique Phase I: Monique Score: 10    Monique Phase II:        Anesthesia Post Evaluation    Patient location during evaluation: bedside  Patient participation: complete - patient participated  Level of consciousness: awake  Pain score: 0  Nausea & Vomiting: no nausea and no vomiting  Complications: no  Cardiovascular status: hemodynamically stable  Respiratory status: acceptable and spontaneous ventilation  Hydration status: euvolemic

## 2023-03-17 NOTE — H&P
Patient Name: Irene Null  : 1965  MRN: [id number]  DATE: 23    Allergies: No Known Allergies     ENDOSCOPY  History and Physical    Procedure:    [] Diagnostic Colonoscopy       [x] Screening Colonoscopy  [] EGD      [] ERCP      [] EUS       [] Other    [x] Previous office notes/History and Physical reviewed from the patients chart. Please see EMR for further details of HPI. I have examined the patient's status immediately prior to the procedure and:      Indications/HPI:    []Abdominal Pain   []Barretts  [x]Screening/Surveillance   []History of Polyps  []Dysphagia            [] +Cologard/DNA testing  []Abnormal Imaging              []EOE Hx              [] Family Hx of CRC/Polyps  []Anemia                            []Food Impaction       []Recent Poor Prep  []GI Bleed             []Lymphadenopathy  []History of Polyps  []Change in bowel habits []Heartburn/Reflux  []Cancer- GI/Lung  []Chest Pain - Non Cardiac []Heme (+) Stool []Ulcers  []Constipation  []Hemoptysis  []Incontinence    []Diarrhea  []Hypoxemia  []Rectal Bleed (BRBPR)  []Nausea/Vomiting   [] Varices  []Crohns/Colitis  []Pancreatic Cyst   [] Cirrhosis   []Pancreatitis    []Abnormal MRCP  []Elevated LFT [] Stent Removal, Previous ERCP  []Other:     Anesthesia:   [x] MAC [] Moderate Sedation   [] General   [] None     ROS: 12 pt Review of Symptoms was negative unless mentioned above    Medications:   Prior to Admission medications    Medication Sig Start Date End Date Taking?  Authorizing Provider   atorvastatin (LIPITOR) 40 MG tablet TAKE ONE TABLET BY MOUTH EVERY DAY 22   Alivia Ontiveros MD   metFORMIN (GLUCOPHAGE) 500 MG tablet TAKE TWO TABLETS BY MOUTH TWICE DAILY 10/3/22   JA Orellana - CNP   dapagliflozin (FARXIGA) 10 MG tablet TAKE ONE TABLET BY MOUTH EVERY DAY 21   Historical Provider, MD   zolpidem (AMBIEN) 10 MG tablet TAKE ONE TABLET BY MOUTH AT BEDTIME 22   Historical Provider, MD Testosterone 20.25 MG/1.25GM (1.62%) GEL Apply 2 pumps of gel daily for testosterone  Patient not taking: Reported on 5/25/2022 3/25/21 4/25/21  JA Silva CNP   testosterone cypionate (DEPOTESTOTERONE CYPIONATE) 100 MG/ML injection 1 ml IM every 2 weeks for 8 weeks then recheck level. Patient not taking: Reported on 5/25/2022 8/28/20 9/28/20  JA Silva CNP   SYRINGE-NEEDLE, DISP, 3 ML 22G X 1-1/2\" 3 ML MISC Testosterone IM every 2 wks 8/28/20   JA Silva CNP   ticagrelor (BRILINTA) 90 MG TABS tablet Take 60 mg by mouth daily     Historical Provider, MD   metoprolol (LOPRESSOR) 25 MG tablet  3/2/16   Historical Provider, MD   ASPIRIN PO Take 81 mg by mouth daily     Historical Provider, MD   Esomeprazole Magnesium (NEXIUM PO) Take  by mouth.       Historical Provider, MD       Past Medical History:  Past Medical History:   Diagnosis Date    CAD (coronary artery disease)     Chest pain     Diabetes mellitus (HCC)     GERD (gastroesophageal reflux disease)     History of atherosclerotic cardiovascular disease     Hyperlipidemia     Hypertension     Pacemaker     Syncope        Past Surgical History:  Past Surgical History:   Procedure Laterality Date    CORONARY ANGIOPLASTY WITH STENT PLACEMENT      PACEMAKER PLACEMENT      UT COLONOSCOPY W/BIOPSY SINGLE/MULTIPLE N/A 12/4/2017    Dr Sebastien Wallace disease-Tubular AP (-) dysplasia-5 yr recall    UT EGD TRANSORAL BIOPSY SINGLE/MULTIPLE N/A 12/4/2017    Dr Redd Rivera-Gastritis       Social History:  Social History     Tobacco Use    Smoking status: Never    Smokeless tobacco: Never   Vaping Use    Vaping Use: Never used   Substance Use Topics    Alcohol use: Yes     Comment: rare    Drug use: No       Vital Signs:   Vitals:    03/17/23 0949   BP: 128/79   Pulse: 67   Resp: 23   Temp: 96.9 °F (36.1 °C)   SpO2: 100%        Physical Exam:  Cardiac:  [x]WNL  []Comments:  Pulmonary:  [x]WNL   []Comments:  Neuro/Mental Status: [x]WNL  []Comments:  Abdominal:  [x]WNL    []Comments:  Other:   []WNL  []Comments:    Informed Consent:  The risks and benefits of the procedure have been discussed with either the patient or if they cannot consent, their representative. Assessment:  Patient examined and appropriate for planned sedation and procedure. Plan:  Proceed with planned sedation and procedure as above.          Sultana Nieto MD

## 2023-05-02 NOTE — PROGRESS NOTES
Cant Get a hold of someone at cardiology wanted me to call it in he thinks he is on the 60 twice a day

## 2023-05-04 ENCOUNTER — TELEPHONE (OUTPATIENT)
Dept: CARDIOLOGY | Facility: CLINIC | Age: 58
End: 2023-05-04
Payer: COMMERCIAL

## 2023-05-04 NOTE — TELEPHONE ENCOUNTER
Patient missed his device clinic appointment for a pacemaker battery check this morning.  RN attempted to call him to reschedule but there was no answer and voicemail was full.  RN will continue to try to reach patient.

## 2023-05-08 NOTE — PROGRESS NOTES
Dual Chamber Pacemaker Interrogation Report  IN OFFICE    January 3, 2023    Primary Cardiologist: Viry  : Medtronic Model: Adapta ADDR01  Implant date: 7/30/2010    Reason for evaluation: Battery Check  Indication for pacemaker: Sick Sinus Syndrome    Measurements  Atrial sensing - P wave: 4-5.6 mV  Atrial threshold: 1.5V@ 0.4ms  Atrial lead impedance: 676 ohms  Ventricular sensing - R wave: 5.6-8 mV  Ventricular threshold: 0.75 V @ 0.4 ms  Ventricular lead impedance:   609 ohms     Manual sensing and threshold testing performed:  YES    Diagnostic Data  Atrial paced: 11.2 %  Ventricular paced: 0.3 %    Episodes/Alerts since 9/29/2022:  SVT x 3, longest 6 seconds, rates 180-202 bpm.  Patient asymptomatic.      Battery status: Intensify Follow Up , 2.71V, device impedance 4,262 ohms, estimated 17 months (<2-32 months) remaining      Final Parameters  Mode:  AAIR <=> DDDR  Lower rate: 50 bpm   Upper rate: 130 bpm  AV Delay: paced- 150 ms  Sensed-120 ms  Atrial - Amplitude: 2 V   Pulse width: 0.4 ms   Sensitivity: 0.5 mV     Ventricular - Amplitude: 2 V  Pulse width: 0.4 ms  Sensitivity: 2.8 mV      Changes made: No changes made.    Conclusions: Normal Pacemaker Function, Stable Pacing and Sensing Thresholds and Battery nearing RRT    Remote Monitor:  No     Follow up: 2 months in office battery check

## 2023-05-08 NOTE — PROGRESS NOTES
Dual Chamber Pacemaker Interrogation Report  IN OFFICE    March 2, 2023    Primary Cardiologist: Viry  : Medtronic Model: Adapta ADDR01  Implant date: 7/30/2010    Reason for evaluation: Battery Check  Indication for pacemaker: Sick Sinus Syndrome    Measurements  Atrial sensing - P wave: 4-5.8 mV  Atrial threshold: 1V@ 0.4ms  Atrial lead impedance: 746 ohms  Ventricular sensing - R wave: 8-11.2 mV  Ventricular threshold: 0.75 V @ 0.4 ms  Ventricular lead impedance:   644 ohms     Manual sensing and threshold testing performed:  YES    Diagnostic Data  Atrial paced: 11.2 %  Ventricular paced: 0.2 %    Episodes/Alerts: SVT x 1, duration 3 seconds, rate 180 bpm.    Battery status: Intensify Follow Up , 2.70V, device impedance 4,728 ohms, estimated 15 months (<1-28 months) remaining      Final Parameters  Mode:  AAIR <=> DDDR  Lower rate: 50 bpm   Upper rate: 130 bpm  AV Delay: paced- 150 ms  Sensed-120 ms  Atrial - Amplitude: 2 V   Pulse width: 0.4 ms   Sensitivity: 0.5 mV     Ventricular - Amplitude: 2 V  Pulse width: 0.4 ms  Sensitivity: 2.8 mV      Changes made: No changes made.    Conclusions: Normal Pacemaker Function, Stable Pacing and Sensing Thresholds and Battery nearing RRT    Remote Monitor:  No    Follow up: 2 months in office battery check.

## 2023-06-06 ENCOUNTER — CLINICAL SUPPORT NO REQUIREMENTS (OUTPATIENT)
Dept: CARDIOLOGY | Facility: CLINIC | Age: 58
End: 2023-06-06
Payer: COMMERCIAL

## 2023-06-06 DIAGNOSIS — I49.5 SICK SINUS SYNDROME: ICD-10-CM

## 2023-06-06 DIAGNOSIS — Z95.0 PACEMAKER: Primary | ICD-10-CM

## 2023-08-14 ENCOUNTER — CLINICAL SUPPORT NO REQUIREMENTS (OUTPATIENT)
Dept: CARDIOLOGY | Facility: CLINIC | Age: 58
End: 2023-08-14
Payer: COMMERCIAL

## 2023-08-14 DIAGNOSIS — Z95.0 PACEMAKER: Primary | ICD-10-CM

## 2023-08-14 DIAGNOSIS — I49.5 SICK SINUS SYNDROME: ICD-10-CM

## 2023-09-28 NOTE — PROGRESS NOTES
Dual Chamber Pacemaker Interrogation Report  IN OFFICE    June 6, 2023    Primary Cardiologist: Viry  : Medtronic Model: Adapta ADDR01  Implant date: 7/30/2010    Reason for evaluation: Battery Check  Indication for pacemaker: Sick Sinus Syndrome    Measurements  Atrial sensing - P wave: 4-5.6 mV  Atrial threshold: 0.75V@ 0.4ms  Atrial lead impedance: 781 ohms  Ventricular sensing - R wave: 8-11.2 mV  Ventricular threshold: 1 V @ 0.4 ms  Ventricular lead impedance:   574 ohms     Manual sensing and threshold testing performed:  Yes    Diagnostic Data  Atrial paced: 14.3 %  Ventricular paced: 0.2 %    Episodes/Alerts since 3/2/23:  3 ventricular high rate episodes, rates 192-233 bpm, longest 5 seconds, appear to be SVT.    Battery status: Intensify Follow Up , 2.66V, estimated 11 months (<1-23 months) remaining, device impedance 5,237 ohms.      Final Parameters  Mode:  AAIR <=> DDDR  Lower rate: 50 bpm   Upper rate: 130 bpm  AV Delay: paced- 150 ms  Sensed-120 ms  Atrial - Amplitude: 1.75 V   Pulse width: 0.4 ms   Sensitivity: 0.5 mV     Ventricular - Amplitude: 2.25 V  Pulse width: 0.4 ms  Sensitivity: 2.8 mV      Changes made: None.    Conclusions: Normal Pacemaker Function and Stable Pacing and Sensing Thresholds and Battery Nearing RRT    Remote Monitor:  No    Follow up: 2 months in office battery check.

## 2023-09-28 NOTE — PROGRESS NOTES
Dual Chamber Pacemaker Interrogation Report  IN OFFICE    August 14, 2023    Primary Cardiologist: Viry  : Medtronic Model: Adapta ADDR01  Implant date: 7/30/2010    Reason for evaluation: Battery Check  Indication for pacemaker: Sick Sinus Syndrome    Measurements  Atrial sensing - P wave: 1-2.8 mV  Atrial threshold: 0.875V@ 0.4ms  Atrial lead impedance: 822 ohms  Ventricular sensing - R wave: 5.6-22.4 mV  Ventricular threshold: 0.875 V @ 0.4 ms  Ventricular lead impedance:   640 ohms     Manual sensing and threshold testing performed:  No    Diagnostic Data  Atrial paced: 10.6 %  Ventricular paced: 0.3 %    Episodes/Alerts: None.    Battery status: Intensify Follow Up , 2.64V, device impedance 6,463 ohms, estimated 7 months (<1-17 months) remaining      Final Parameters  Mode:  AAIR <=> DDDR  Lower rate: 50 bpm   Upper rate: 130 bpm  AV Delay: paced- 150 ms  Sensed-120 ms  Atrial - Amplitude: 1.75 V   Pulse width: 0.4 ms   Sensitivity: 0.5 mV     Ventricular - Amplitude: 2 V  Pulse width: 0.4 ms  Sensitivity: 2.8 mV      Changes made: None.    Conclusions: Normal Pacemaker Function, Stable Pacing and Sensing Thresholds, and Battery Nearing RRT    Remote Monitor:  No    Follow up: 1 month in office battery check

## 2023-09-29 ENCOUNTER — OFFICE VISIT (OUTPATIENT)
Dept: CARDIOLOGY | Facility: CLINIC | Age: 58
End: 2023-09-29
Payer: COMMERCIAL

## 2023-09-29 ENCOUNTER — CLINICAL SUPPORT NO REQUIREMENTS (OUTPATIENT)
Dept: CARDIOLOGY | Facility: CLINIC | Age: 58
End: 2023-09-29
Payer: COMMERCIAL

## 2023-09-29 VITALS
BODY MASS INDEX: 27.74 KG/M2 | WEIGHT: 183 LBS | HEART RATE: 64 BPM | OXYGEN SATURATION: 98 % | HEIGHT: 68 IN | SYSTOLIC BLOOD PRESSURE: 120 MMHG | DIASTOLIC BLOOD PRESSURE: 82 MMHG

## 2023-09-29 DIAGNOSIS — E78.2 MIXED HYPERLIPIDEMIA: ICD-10-CM

## 2023-09-29 DIAGNOSIS — I25.10 ATHEROSCLEROSIS OF NATIVE CORONARY ARTERY OF NATIVE HEART WITHOUT ANGINA PECTORIS: Primary | ICD-10-CM

## 2023-09-29 DIAGNOSIS — I49.5 SICK SINUS SYNDROME: ICD-10-CM

## 2023-09-29 DIAGNOSIS — E11.9 TYPE 2 DIABETES MELLITUS WITHOUT COMPLICATION, WITHOUT LONG-TERM CURRENT USE OF INSULIN: ICD-10-CM

## 2023-09-29 DIAGNOSIS — Z95.0 PACEMAKER: Primary | ICD-10-CM

## 2023-09-29 PROCEDURE — 93280 PM DEVICE PROGR EVAL DUAL: CPT | Performed by: INTERNAL MEDICINE

## 2023-09-29 RX ORDER — NITROGLYCERIN 0.4 MG/1
0.4 TABLET SUBLINGUAL AS NEEDED
Qty: 25 TABLET | Refills: 2 | Status: SHIPPED | OUTPATIENT
Start: 2023-09-29

## 2023-09-29 RX ORDER — DAPAGLIFLOZIN 10 MG/1
1 TABLET, FILM COATED ORAL DAILY
Qty: 90 TABLET | Refills: 3 | Status: SHIPPED | OUTPATIENT
Start: 2023-09-29

## 2023-09-29 NOTE — PROGRESS NOTES
Dual Chamber Pacemaker Interrogation Report  IN OFFICE    September 29, 2023    Primary Cardiologist: Viry  : Medtronic Model: Adapta ADDR01  Implant date: 7/30/2010    Reason for evaluation: Battery Check  Indication for pacemaker: Sick Sinus Syndrome    Measurements  Atrial sensing - P wave: 4-5.6 mV  Atrial threshold: 0.75V@ 0.4ms  Atrial lead impedance: 764 ohms  Ventricular sensing - R wave: 5.6-8 mV  Ventricular threshold: 0.5 V @ 0.4 ms  Ventricular lead impedance:   646 ohms     Manual sensing and threshold testing performed:  Yes    Diagnostic Data  Atrial paced: 11.5 %  Ventricular paced: 0.3 %    Episodes/Alerts: 2 episodes, longest 8 minutes 49 seconds, rates 186-197 bpm, appears to be SVT.    Battery status: Intensify Follow Up , estimated 2 months (<1-10 months) remaining, 2.61V, device impedance 7,785 ohms      Final Parameters  Mode:  AAIR <=> DDDR  Lower rate: 50 bpm   Upper rate: 130 bpm  AV Delay: paced- 150 ms  Sensed-120 ms  Atrial - Amplitude: 1.5 V   Pulse width: 0.4 ms   Sensitivity: 0.5 mV     Ventricular - Amplitude: 2 V  Pulse width: 0.46 ms  Sensitivity: 2.8 mV      Changes made: No changes.    Conclusions: Normal Pacemaker Function, Stable Pacing and Sensing Thresholds, and Battery Nearing RRT    Remote Monitor:  No    Follow up: 1 month in office battery check.

## 2023-09-29 NOTE — PROGRESS NOTES
Subjective:     Encounter Date:09/29/2023      Patient ID: Fred Atkins is a 58 y.o. male with coronary artery disease, status post previous PCI to LAD and RCA, pacemaker in place for sick sinus syndrome (by Dr. Cam), hyperlipidemia, type 2 diabetes mellitus, here for follow-up.     Chief Complaint: Here for follow-up of CAD, pacemaker    History of Present Illness    This patient presents today for routine follow-up.  He does have a history of coronary artery disease with previous PCI.  He remains chest pain-free at this time.  No significant shortness of breath or dyspnea with exertion.  He also has a history of sinus node dysfunction and has a dual-chamber pacemaker in place.  He denies having lightheadedness, dizziness, syncope, palpitations.  His generator is nearing FLY.  The device has been appropriately functioning.  Blood pressure is generally well controlled.  The patient is due for a lipid panel.  He is tolerating all of his medications well.  He denies have any significant bleeding issues on antiplatelet therapy.  Overall, he says that he seems to be doing well at this time and has no significant cardiac complaints.      Current Outpatient Medications:     aspirin 81 MG EC tablet, Take 1 tablet by mouth Daily., Disp: , Rfl:     atorvastatin (LIPITOR) 40 MG tablet, Take 1 tablet by mouth Every Night., Disp: , Rfl:     dapagliflozin Propanediol (Farxiga) 10 MG tablet, Take 10 mg by mouth Daily., Disp: 90 tablet, Rfl: 3    metFORMIN (GLUCOPHAGE) 500 MG tablet, Take 1 tablet by mouth 2 (Two) Times a Day With Meals., Disp: , Rfl:     metoprolol tartrate (LOPRESSOR) 25 MG tablet, Take 1 tablet by mouth Daily., Disp: 90 tablet, Rfl: 3    nitroglycerin (NITROSTAT) 0.4 MG SL tablet, Place 1 tablet under the tongue As Needed for Chest Pain., Disp: 25 tablet, Rfl: 2    omeprazole (priLOSEC) 20 MG capsule, Take 1 capsule by mouth Daily As Needed., Disp: , Rfl:     ticagrelor (BRILINTA) 60 MG tablet  tablet, Take 1 tablet by mouth 2 (Two) Times a Day., Disp: 180 tablet, Rfl: 3    zolpidem (AMBIEN) 5 MG tablet, Take 1 tablet by mouth At Night As Needed for Sleep., Disp: , Rfl:     No Known Allergies    Social History     Tobacco Use    Smoking status: Never    Smokeless tobacco: Never   Substance Use Topics    Alcohol use: Yes     Comment: OCCASIONALLY     Review of Systems   Constitutional: Negative for fever and weight loss.   Cardiovascular:  Negative for chest pain, dyspnea on exertion, leg swelling, orthopnea, palpitations, paroxysmal nocturnal dyspnea and syncope.   Respiratory:  Negative for cough, shortness of breath and wheezing.    Gastrointestinal:  Negative for abdominal pain, nausea and vomiting.   Neurological:  Negative for dizziness, headaches and loss of balance.       ECG 12 Lead    Date/Time: 9/29/2023 10:58 AM  Performed by: Derek Baires MD  Authorized by: Derek Baires MD   Comparison: compared with previous ECG from 9/29/2022  Similar to previous ECG  Rhythm: sinus bradycardia  Rate: bradycardic  BPM: 58  Conduction: conduction normal  QRS axis: right  Other findings: non-specific ST-T wave changes    Clinical impression: abnormal EKG           Objective:     Vitals reviewed.   Constitutional:       General: Not in acute distress.     Appearance: Well-developed and not in distress.   Eyes:      Extraocular Movements: Extraocular movements intact.   HENT:      Head: Normocephalic and atraumatic.   Pulmonary:      Effort: Pulmonary effort is normal.      Breath sounds: Normal breath sounds. No wheezing. No rhonchi. No rales.   Cardiovascular:      Bradycardia present. Regular rhythm.      Murmurs: There is no murmur.      No gallop.    Pulses:     Intact distal pulses.   Edema:     Peripheral edema absent.   Abdominal:      General: Bowel sounds are normal. There is no distension.      Palpations: Abdomen is soft.      Tenderness: There is no abdominal tenderness.  "  Skin:     General: Skin is warm and dry. There is no cyanosis.      Findings: No erythema or rash.   Neurological:      Mental Status: Alert and oriented to person, place, and time.      Cranial Nerves: No cranial nerve deficit.     /82   Pulse 64   Ht 172.7 cm (68\")   Wt 83 kg (183 lb)   SpO2 98%   BMI 27.83 kg/m²     Data/Lab Review:     I reviewed today's pacemaker interrogation.  The patient does have a Medtronic dual-chamber pacemaker.  Estimated battery reserve is 2 months.  Normal lead function noted.    Lab Results   Component Value Date    CHLPL 227 (H) 05/26/2022    TRIG 453 (H) 05/26/2022    HDL 42 (L) 05/26/2022    LDL see below 05/26/2022     05/26/2022         Assessment:          Diagnosis Plan   1. Atherosclerosis of native coronary artery of native heart without angina pectoris  nitroglycerin (NITROSTAT) 0.4 MG SL tablet    ECG 12 Lead      2. Sick sinus syndrome        3. Mixed hyperlipidemia  Lipid Panel      4. Type 2 diabetes mellitus without complication, without long-term current use of insulin  dapagliflozin Propanediol (Farxiga) 10 MG tablet             Plan:       1.  Coronary artery disease: Stable at this time with no anginal symptoms described.  The patient remains on aspirin and low-dose Brilinta.  He is also on beta-blocker and statin therapies.  No testing at this time.  Continue current medications.    2.  Sick sinus syndrome: The patient does have a pacemaker in place.  Today's interrogation is reviewed showing approximately 2 months remaining on the current pacemaker generator.  We will plan pacemaker generator change when the patient's device triggers FLY.    3.  Mixed hyperlipidemia: The patient is due for a lipid panel.  He does continue statin therapy.  His most recent LDL was not at goal.    4.  Type 2 diabetes mellitus: Refill Farxiga today.    We will see the patient again in 1 year in the clinic and we will likely change his pacemaker generator between " now and then.

## 2023-10-10 ENCOUNTER — TRANSCRIBE ORDERS (OUTPATIENT)
Dept: ADMINISTRATIVE | Facility: HOSPITAL | Age: 58
End: 2023-10-10
Payer: COMMERCIAL

## 2023-10-10 DIAGNOSIS — I25.10 ATHEROSCLEROSIS OF NATIVE CORONARY ARTERY OF NATIVE HEART WITHOUT ANGINA PECTORIS: Primary | ICD-10-CM

## 2023-10-30 ENCOUNTER — CLINICAL SUPPORT NO REQUIREMENTS (OUTPATIENT)
Dept: CARDIOLOGY | Facility: CLINIC | Age: 58
End: 2023-10-30
Payer: COMMERCIAL

## 2023-10-30 DIAGNOSIS — Z95.0 PACEMAKER: Primary | ICD-10-CM

## 2023-10-30 DIAGNOSIS — I49.5 SICK SINUS SYNDROME: ICD-10-CM

## 2023-10-30 PROCEDURE — 93288 INTERROG EVL PM/LDLS PM IP: CPT | Performed by: INTERNAL MEDICINE

## 2023-10-30 NOTE — PROGRESS NOTES
Dual Chamber Pacemaker Interrogation Report  IN OFFICE    October 30, 2023    Primary Cardiologist: Viry  : Medtronic Model: Adapta ADDR01, Atrial Lead (Medtronic 4575), RV Lead (Medtronic 5076-58)  Implant date: 7/30/2010    Reason for evaluation: Battery Check  Indication for pacemaker: Sick Sinus Syndrome    Interrogation performed by:  Chantal Gaston RN    Measurements  Atrial sensing - P wave: N/P   Atrial threshold: N/P  Atrial lead impedance: N/P  Ventricular sensing - R wave: N/P  Ventricular threshold: N/P  Ventricular lead impedance:   589 ohms     Manual sensing and threshold testing performed:  No    Diagnostic Data  Atrial paced: 0 %  Ventricular paced: 0.3 %    Episodes/Alerts: None.    Battery status: Elective Replacement Time (ERT/RRT) , RRT/FLY on 10/22/23, 2.62V, device impedance 9452 ohms      Final Parameters  Mode:  VVI  Lower rate: 65 bpm     Ventricular - Amplitude: 2.25 V  Pulse width: 0.4 ms  Sensitivity: 2.8 mV      Changes made: None.    Conclusions: Normal Pacemaker Function, Stable Pacing and Sensing Thresholds, and Battery at Elective Replacement Voltage    Remote Monitor:  No, but does want one with new device.    Follow up: 6 weeks s/p generator change.

## 2023-10-30 NOTE — Clinical Note
Medtronic dual chamber pacemaker at RRT on 10.22.23.  Please enter orders for generator change.  Please review and sign.

## 2023-10-31 ENCOUNTER — TELEPHONE (OUTPATIENT)
Dept: CARDIOLOGY | Facility: CLINIC | Age: 58
End: 2023-10-31
Payer: COMMERCIAL

## 2023-10-31 RX ORDER — SODIUM CHLORIDE 9 MG/ML
40 INJECTION, SOLUTION INTRAVENOUS AS NEEDED
OUTPATIENT
Start: 2023-10-31

## 2023-10-31 RX ORDER — SODIUM CHLORIDE 0.9 % (FLUSH) 0.9 %
10 SYRINGE (ML) INJECTION AS NEEDED
OUTPATIENT
Start: 2023-10-31

## 2023-10-31 RX ORDER — NITROGLYCERIN 0.4 MG/1
0.4 TABLET SUBLINGUAL
OUTPATIENT
Start: 2023-10-31

## 2023-10-31 RX ORDER — SODIUM CHLORIDE 9 MG/ML
125 INJECTION, SOLUTION INTRAVENOUS CONTINUOUS
OUTPATIENT
Start: 2023-10-31

## 2023-10-31 RX ORDER — SODIUM CHLORIDE 0.9 % (FLUSH) 0.9 %
10 SYRINGE (ML) INJECTION EVERY 12 HOURS SCHEDULED
OUTPATIENT
Start: 2023-10-31

## 2023-10-31 NOTE — TELEPHONE ENCOUNTER
Per Dr. Baires, if pacemaker generator change is scheduled for next Wednesday, 11/8/23, patient will need to stop Brilinta tomorrow, 11/1/23, but should continue ASA without any missed doses.  Anayeli Fletcher, , notified.

## 2023-11-01 PROBLEM — Z95.0 PACEMAKER: Status: ACTIVE | Noted: 2023-10-30

## 2023-11-08 ENCOUNTER — HOSPITAL ENCOUNTER (OUTPATIENT)
Facility: HOSPITAL | Age: 58
Setting detail: HOSPITAL OUTPATIENT SURGERY
Discharge: HOME | End: 2023-11-08
Attending: INTERNAL MEDICINE | Admitting: INTERNAL MEDICINE
Payer: COMMERCIAL

## 2023-11-08 VITALS
BODY MASS INDEX: 27.52 KG/M2 | DIASTOLIC BLOOD PRESSURE: 64 MMHG | RESPIRATION RATE: 18 BRPM | HEART RATE: 58 BPM | SYSTOLIC BLOOD PRESSURE: 96 MMHG | HEIGHT: 68 IN | OXYGEN SATURATION: 96 % | TEMPERATURE: 96.9 F | WEIGHT: 181.6 LBS

## 2023-11-08 DIAGNOSIS — Z95.0 PACEMAKER: ICD-10-CM

## 2023-11-08 DIAGNOSIS — I49.5 SICK SINUS SYNDROME: ICD-10-CM

## 2023-11-08 PROCEDURE — 25010000002 CEFAZOLIN PER 500 MG: Performed by: INTERNAL MEDICINE

## 2023-11-08 PROCEDURE — 99153 MOD SED SAME PHYS/QHP EA: CPT | Performed by: INTERNAL MEDICINE

## 2023-11-08 PROCEDURE — 33228 REMV&REPLC PM GEN DUAL LEAD: CPT | Performed by: INTERNAL MEDICINE

## 2023-11-08 PROCEDURE — 25010000002 FENTANYL CITRATE (PF) 50 MCG/ML SOLUTION: Performed by: INTERNAL MEDICINE

## 2023-11-08 PROCEDURE — 25810000003 SODIUM CHLORIDE 0.9 % SOLUTION: Performed by: INTERNAL MEDICINE

## 2023-11-08 PROCEDURE — 99152 MOD SED SAME PHYS/QHP 5/>YRS: CPT | Performed by: INTERNAL MEDICINE

## 2023-11-08 PROCEDURE — C1785 PMKR, DUAL, RATE-RESP: HCPCS | Performed by: INTERNAL MEDICINE

## 2023-11-08 PROCEDURE — 25010000002 MIDAZOLAM PER 1 MG: Performed by: INTERNAL MEDICINE

## 2023-11-08 DEVICE — GEN PM AZURE XT SURESCAN DR MRI: Type: IMPLANTABLE DEVICE | Status: FUNCTIONAL

## 2023-11-08 RX ORDER — SODIUM CHLORIDE 0.9 % (FLUSH) 0.9 %
10 SYRINGE (ML) INJECTION AS NEEDED
Status: DISCONTINUED | OUTPATIENT
Start: 2023-11-08 | End: 2023-11-08 | Stop reason: HOSPADM

## 2023-11-08 RX ORDER — NITROGLYCERIN 0.4 MG/1
0.4 TABLET SUBLINGUAL
Status: DISCONTINUED | OUTPATIENT
Start: 2023-11-08 | End: 2023-11-08 | Stop reason: HOSPADM

## 2023-11-08 RX ORDER — SODIUM CHLORIDE 9 MG/ML
40 INJECTION, SOLUTION INTRAVENOUS AS NEEDED
Status: DISCONTINUED | OUTPATIENT
Start: 2023-11-08 | End: 2023-11-08 | Stop reason: HOSPADM

## 2023-11-08 RX ORDER — FENTANYL CITRATE 50 UG/ML
INJECTION, SOLUTION INTRAMUSCULAR; INTRAVENOUS
Status: DISCONTINUED | OUTPATIENT
Start: 2023-11-08 | End: 2023-11-08 | Stop reason: HOSPADM

## 2023-11-08 RX ORDER — SODIUM CHLORIDE 9 MG/ML
125 INJECTION, SOLUTION INTRAVENOUS CONTINUOUS
Status: DISCONTINUED | OUTPATIENT
Start: 2023-11-08 | End: 2023-11-08 | Stop reason: HOSPADM

## 2023-11-08 RX ORDER — LIDOCAINE HYDROCHLORIDE 20 MG/ML
INJECTION, SOLUTION INFILTRATION; PERINEURAL
Status: DISCONTINUED | OUTPATIENT
Start: 2023-11-08 | End: 2023-11-08 | Stop reason: HOSPADM

## 2023-11-08 RX ORDER — SODIUM CHLORIDE 0.9 % (FLUSH) 0.9 %
10 SYRINGE (ML) INJECTION EVERY 12 HOURS SCHEDULED
Status: DISCONTINUED | OUTPATIENT
Start: 2023-11-08 | End: 2023-11-08 | Stop reason: HOSPADM

## 2023-11-08 RX ORDER — NEOMYCIN AND POLYMYXIN B SULFATES 40; 200000 MG/ML; [USP'U]/ML
SOLUTION IRRIGATION
Status: DISCONTINUED | OUTPATIENT
Start: 2023-11-08 | End: 2023-11-08 | Stop reason: HOSPADM

## 2023-11-08 RX ORDER — MIDAZOLAM HYDROCHLORIDE 1 MG/ML
INJECTION INTRAMUSCULAR; INTRAVENOUS
Status: DISCONTINUED | OUTPATIENT
Start: 2023-11-08 | End: 2023-11-08 | Stop reason: HOSPADM

## 2023-11-08 RX ADMIN — Medication 10 ML: at 08:36

## 2023-11-08 RX ADMIN — CEFAZOLIN 2000 MG: 2 INJECTION, POWDER, FOR SOLUTION INTRAMUSCULAR; INTRAVENOUS at 08:35

## 2023-11-08 RX ADMIN — SODIUM CHLORIDE 125 ML/HR: 9 INJECTION, SOLUTION INTRAVENOUS at 08:36

## 2023-11-08 NOTE — H&P
"Chief Complaint: Here for elective pacemaker generator change    S: This patient presents today for elective pacemaker generator change.  He does have a history of sinus node dysfunction and has a pacemaker with generator that has reached elective replacement.  Patient has otherwise been feeling well from a cardiovascular standpoint and has no changes in his health since his last office visit on 9/29/2023.    I have reviewed all elements of the patient's past medical, past surgical, home medications, allergies, family and social history with the patient and updated these in the medical record as needed.    Review of systems negative except as otherwise noted in the HPI.    O:  /77 (BP Location: Right arm, Patient Position: Lying)   Pulse 65   Temp 96.9 °F (36.1 °C) (Temporal)   Resp 13   Ht 172.7 cm (68\")   Wt 82.4 kg (181 lb 9.6 oz)   SpO2 99%   BMI 27.61 kg/m²   Temp:  [96.9 °F (36.1 °C)] 96.9 °F (36.1 °C)  Heart Rate:  [65] 65  Resp:  [13] 13  BP: (101)/(77) 101/77    Gen.: Awake alert and oriented ×3 and in no acute distress  HEENT: Normocephalic, atraumatic, pupils equally round and reactive to light, oropharynx clear, mucous membranes moist  Neck: Supple, no elevation of JVP, no thyromegaly, no carotid bruits  CV: Regular rate and rhythm, no audible murmurs, rubs, gallops  Pulmonary: Clear to auscultation bilaterally, no wheezes, rhonchi or rales  GI: Soft, nontender, nondistended, active bowel sounds  Extremities: Warm and well-perfused, no dermatitis or ulceration, no clubbing, cyanosis, edema  Neurologic: Cranial nerves are grossly intact, patient moves all 4 extremities equally during examination    Diagnostic Data:    No labs    ASSESSMENT/PLAN:    1.  Sinus node dysfunction with current pacemaker generator at FLY.    -Plan for pacemaker generator change today.  The procedure, risk, benefits and alternatives have been discussed with the patient.  He is agreeable to proceed as planned.    "

## 2023-11-08 NOTE — Clinical Note
Prepped: left chest. Prepped with: ChloraPrep and Hibiclens. The site was clipped. The patient was draped in a sterile fashion.

## 2023-12-08 ENCOUNTER — HOSPITAL ENCOUNTER (OUTPATIENT)
Dept: CARDIOLOGY | Facility: HOSPITAL | Age: 58
Discharge: HOME | End: 2023-12-08
Payer: COMMERCIAL

## 2024-01-07 ENCOUNTER — APPOINTMENT (OUTPATIENT)
Dept: CT IMAGING | Age: 59
End: 2024-01-07
Payer: COMMERCIAL

## 2024-01-07 ENCOUNTER — APPOINTMENT (OUTPATIENT)
Dept: GENERAL RADIOLOGY | Age: 59
End: 2024-01-07
Payer: COMMERCIAL

## 2024-01-07 ENCOUNTER — HOSPITAL ENCOUNTER (EMERGENCY)
Age: 59
Discharge: HOME | End: 2024-01-07
Attending: EMERGENCY MEDICINE
Payer: COMMERCIAL

## 2024-01-07 VITALS
SYSTOLIC BLOOD PRESSURE: 146 MMHG | DIASTOLIC BLOOD PRESSURE: 86 MMHG | OXYGEN SATURATION: 95 % | WEIGHT: 185 LBS | RESPIRATION RATE: 16 BRPM | BODY MASS INDEX: 28.13 KG/M2 | TEMPERATURE: 97.5 F | HEART RATE: 67 BPM

## 2024-01-07 DIAGNOSIS — Z86.39 HX OF DIABETES MELLITUS: ICD-10-CM

## 2024-01-07 DIAGNOSIS — K44.9 HIATAL HERNIA: Primary | ICD-10-CM

## 2024-01-07 LAB
ACETONE SERPL QL SCN: NEGATIVE
ALBUMIN SERPL-MCNC: 4 G/DL (ref 3.5–5.2)
ALP SERPL-CCNC: 234 U/L (ref 40–130)
ALT SERPL-CCNC: 30 U/L (ref 5–41)
ANION GAP SERPL CALCULATED.3IONS-SCNC: 10 MMOL/L (ref 7–19)
AST SERPL-CCNC: 15 U/L (ref 5–40)
BASE EXCESS VENOUS: 1 MMOL/L
BASOPHILS # BLD: 0.1 K/UL (ref 0–0.2)
BASOPHILS NFR BLD: 0.6 % (ref 0–1)
BILIRUB SERPL-MCNC: 0.5 MG/DL (ref 0.2–1.2)
BILIRUB UR QL STRIP: NEGATIVE
BNP BLD-MCNC: 81 PG/ML (ref 0–124)
BUN SERPL-MCNC: 12 MG/DL (ref 6–20)
CALCIUM SERPL-MCNC: 9.1 MG/DL (ref 8.6–10)
CARBOXYHEMOGLOBIN: 1.8 %
CHLORIDE SERPL-SCNC: 98 MMOL/L (ref 98–111)
CLARITY UR: CLEAR
CO2 SERPL-SCNC: 26 MMOL/L (ref 22–29)
COLOR UR: YELLOW
CREAT SERPL-MCNC: 0.7 MG/DL (ref 0.5–1.2)
EOSINOPHIL # BLD: 0.2 K/UL (ref 0–0.6)
EOSINOPHIL NFR BLD: 1.8 % (ref 0–5)
ERYTHROCYTE [DISTWIDTH] IN BLOOD BY AUTOMATED COUNT: 13 % (ref 11.5–14.5)
GLUCOSE BLD-MCNC: 238 MG/DL (ref 70–99)
GLUCOSE BLD-MCNC: 255 MG/DL (ref 70–99)
GLUCOSE BLD-MCNC: 329 MG/DL (ref 70–99)
GLUCOSE SERPL-MCNC: 304 MG/DL (ref 74–109)
GLUCOSE UR STRIP.AUTO-MCNC: =>1000 MG/DL
HCO3 VENOUS: 26 MMOL/L (ref 23–29)
HCT VFR BLD AUTO: 44.1 % (ref 42–52)
HGB BLD-MCNC: 15 G/DL (ref 14–18)
HGB UR STRIP.AUTO-MCNC: NEGATIVE MG/L
IMM GRANULOCYTES # BLD: 0 K/UL
KETONES UR STRIP.AUTO-MCNC: NEGATIVE MG/DL
LEUKOCYTE ESTERASE UR QL STRIP.AUTO: NEGATIVE
LYMPHOCYTES # BLD: 1.1 K/UL (ref 1.1–4.5)
LYMPHOCYTES NFR BLD: 10.9 % (ref 20–40)
MCH RBC QN AUTO: 29 PG (ref 27–31)
MCHC RBC AUTO-ENTMCNC: 34 G/DL (ref 33–37)
MCV RBC AUTO: 85.3 FL (ref 80–94)
METHEMOGLOBIN VENOUS: 0.7 %
MONOCYTES # BLD: 0.5 K/UL (ref 0–0.9)
MONOCYTES NFR BLD: 5.3 % (ref 0–10)
NEUTROPHILS # BLD: 8.2 K/UL (ref 1.5–7.5)
NEUTS SEG NFR BLD: 81 % (ref 50–65)
NITRITE UR QL STRIP.AUTO: NEGATIVE
O2 CONTENT, VEN: 15 ML/DL
O2 SAT, VEN: 75 %
PCO2, VEN: 41 MMHG (ref 40–50)
PERFORMED ON: ABNORMAL
PH UR STRIP.AUTO: 7 [PH] (ref 5–8)
PH VENOUS: 7.41 (ref 7.35–7.45)
PLATELET # BLD AUTO: 228 K/UL (ref 130–400)
PMV BLD AUTO: 9.3 FL (ref 9.4–12.4)
PO2, VEN: 37 MMHG
POTASSIUM SERPL-SCNC: 4.3 MMOL/L (ref 3.5–5)
PROT SERPL-MCNC: 7 G/DL (ref 6.6–8.7)
PROT UR STRIP.AUTO-MCNC: NEGATIVE MG/DL
RBC # BLD AUTO: 5.17 M/UL (ref 4.7–6.1)
SODIUM SERPL-SCNC: 134 MMOL/L (ref 136–145)
SP GR UR STRIP.AUTO: >=1.045 (ref 1–1.03)
TROPONIN, HIGH SENSITIVITY: <6 NG/L (ref 0–22)
UROBILINOGEN UR STRIP.AUTO-MCNC: 1 E.U./DL
WBC # BLD AUTO: 10.1 K/UL (ref 4.8–10.8)

## 2024-01-07 PROCEDURE — 71045 X-RAY EXAM CHEST 1 VIEW: CPT

## 2024-01-07 PROCEDURE — 80053 COMPREHEN METABOLIC PANEL: CPT

## 2024-01-07 PROCEDURE — 83880 ASSAY OF NATRIURETIC PEPTIDE: CPT

## 2024-01-07 PROCEDURE — 82803 BLOOD GASES ANY COMBINATION: CPT

## 2024-01-07 PROCEDURE — 84484 ASSAY OF TROPONIN QUANT: CPT

## 2024-01-07 PROCEDURE — 74177 CT ABD & PELVIS W/CONTRAST: CPT

## 2024-01-07 PROCEDURE — 36415 COLL VENOUS BLD VENIPUNCTURE: CPT

## 2024-01-07 PROCEDURE — 96374 THER/PROPH/DIAG INJ IV PUSH: CPT

## 2024-01-07 PROCEDURE — 85025 COMPLETE CBC W/AUTO DIFF WBC: CPT

## 2024-01-07 PROCEDURE — 96375 TX/PRO/DX INJ NEW DRUG ADDON: CPT

## 2024-01-07 PROCEDURE — 93005 ELECTROCARDIOGRAM TRACING: CPT | Performed by: PHYSICIAN ASSISTANT

## 2024-01-07 PROCEDURE — 82009 KETONE BODYS QUAL: CPT

## 2024-01-07 PROCEDURE — 6360000002 HC RX W HCPCS: Performed by: PHYSICIAN ASSISTANT

## 2024-01-07 PROCEDURE — 99285 EMERGENCY DEPT VISIT HI MDM: CPT

## 2024-01-07 PROCEDURE — 82962 GLUCOSE BLOOD TEST: CPT

## 2024-01-07 PROCEDURE — 81003 URINALYSIS AUTO W/O SCOPE: CPT

## 2024-01-07 PROCEDURE — 2580000003 HC RX 258: Performed by: PHYSICIAN ASSISTANT

## 2024-01-07 PROCEDURE — 6360000004 HC RX CONTRAST MEDICATION: Performed by: PHYSICIAN ASSISTANT

## 2024-01-07 PROCEDURE — 82800 BLOOD PH: CPT

## 2024-01-07 PROCEDURE — C9113 INJ PANTOPRAZOLE SODIUM, VIA: HCPCS | Performed by: PHYSICIAN ASSISTANT

## 2024-01-07 RX ORDER — METOCLOPRAMIDE 10 MG/1
10 TABLET ORAL 4 TIMES DAILY
Qty: 120 TABLET | Refills: 3 | Status: SHIPPED | OUTPATIENT
Start: 2024-01-07 | End: 2024-01-09 | Stop reason: ALTCHOICE

## 2024-01-07 RX ORDER — PANTOPRAZOLE SODIUM 20 MG/1
20 TABLET, DELAYED RELEASE ORAL DAILY
Qty: 30 TABLET | Refills: 0 | Status: SHIPPED | OUTPATIENT
Start: 2024-01-07 | End: 2024-01-09 | Stop reason: ALTCHOICE

## 2024-01-07 RX ORDER — 0.9 % SODIUM CHLORIDE 0.9 %
500 INTRAVENOUS SOLUTION INTRAVENOUS ONCE
Status: COMPLETED | OUTPATIENT
Start: 2024-01-07 | End: 2024-01-07

## 2024-01-07 RX ORDER — METOCLOPRAMIDE HYDROCHLORIDE 5 MG/ML
10 INJECTION INTRAMUSCULAR; INTRAVENOUS ONCE
Status: COMPLETED | OUTPATIENT
Start: 2024-01-07 | End: 2024-01-07

## 2024-01-07 RX ADMIN — SODIUM CHLORIDE 500 ML: 9 INJECTION, SOLUTION INTRAVENOUS at 17:47

## 2024-01-07 RX ADMIN — SODIUM CHLORIDE, PRESERVATIVE FREE 40 MG: 5 INJECTION INTRAVENOUS at 20:06

## 2024-01-07 RX ADMIN — IOPAMIDOL 70 ML: 755 INJECTION, SOLUTION INTRAVENOUS at 18:18

## 2024-01-07 RX ADMIN — METOCLOPRAMIDE 10 MG: 5 INJECTION, SOLUTION INTRAMUSCULAR; INTRAVENOUS at 20:06

## 2024-01-07 ASSESSMENT — ENCOUNTER SYMPTOMS
COUGH: 0
EYE DISCHARGE: 0
PHOTOPHOBIA: 0
COLOR CHANGE: 0
EYE ITCHING: 0
SHORTNESS OF BREATH: 0
BACK PAIN: 0
APNEA: 0

## 2024-01-07 ASSESSMENT — PAIN - FUNCTIONAL ASSESSMENT: PAIN_FUNCTIONAL_ASSESSMENT: 0-10

## 2024-01-07 ASSESSMENT — PAIN DESCRIPTION - LOCATION: LOCATION: BACK

## 2024-01-07 ASSESSMENT — PAIN SCALES - GENERAL: PAINLEVEL_OUTOF10: 4

## 2024-01-07 ASSESSMENT — PAIN DESCRIPTION - ORIENTATION: ORIENTATION: RIGHT

## 2024-01-07 NOTE — ED PROVIDER NOTES
[facility] EMERGENCY DEPT  eMERGENCYdEPARTMENT eNCOUnter      Pt Name: Judah Vigil  MRN: [id number]  Birthdate 1965  Date of evaluation: 1/7/2024  Provider:RICKY Solis    CHIEF COMPLAINT       Chief Complaint   Patient presents with    Hyperglycemia     Pt reports bgl over 400 today with upset stomach         HISTORY OF PRESENT ILLNESS  (Location/Symptom, Timing/Onset, Context/Setting, Quality, Duration, Modifying Factors, Severity.)   Judah Vigil is a 58 y.o. male who presents to the emergency department with complaints of hyperglycemia over 400 today. Recent stomach ache. Denies urinary symptoms no fever chills. No nausea vomiting.some atypical back pain and epigastric pain. Denies pleurisy. Had pacemaker replaced 2 mo ago. [facility] [place]. Interrogation done here showing nothing acute.     HPI    Nursing Notes were reviewed and I agree.    REVIEW OF SYSTEMS    (2-9 systems for level 4, 10 or more for level 5)     Review of Systems   Constitutional:  Negative for activity change, appetite change, chills and fever.   HENT:  Negative for congestion and dental problem.    Eyes:  Negative for photophobia, discharge and itching.   Respiratory:  Negative for apnea, cough and shortness of breath.    Cardiovascular:  Negative for chest pain.   Musculoskeletal:  Negative for arthralgias, back pain, gait problem, myalgias and neck pain.   Skin:  Negative for color change, pallor and rash.   Neurological:  Negative for dizziness, seizures and syncope.   Psychiatric/Behavioral:  Negative for agitation. The patient is not nervous/anxious.         Except as noted above the remainder of the review of systems was reviewed and negative.       PAST MEDICAL HISTORY     Past Medical History:   Diagnosis Date    CAD (coronary artery disease)     Chest pain     Diabetes mellitus (HCC)     GERD (gastroesophageal reflux disease)     History of atherosclerotic cardiovascular disease     Hyperlipidemia     Hypertension

## 2024-01-08 NOTE — ED NOTES
Spoke with Donna with [employer] who states that pt has had no arrhthymias since last checked and that the device is functioning as it should.

## 2024-01-08 NOTE — ED NOTES
Discussed holding 5un Insulin at this time due to pts glucose trending down prior to arrival. Will allow fluids to run and recheck POCT in approx 30 minutes.

## 2024-01-08 NOTE — DISCHARGE INSTRUCTIONS
Protonix is alternate to taking for gerd reflux   Reglan helping with spasm pain and nausea  GI referral likely call rodrigo tomorrow  General goal of sugar to be lower 100s ideally under 200

## 2024-01-09 ENCOUNTER — OFFICE VISIT (OUTPATIENT)
Dept: PRIMARY CARE CLINIC | Age: 59
End: 2024-01-09
Payer: COMMERCIAL

## 2024-01-09 VITALS
WEIGHT: 174.4 LBS | RESPIRATION RATE: 16 BRPM | TEMPERATURE: 97.5 F | BODY MASS INDEX: 26.43 KG/M2 | SYSTOLIC BLOOD PRESSURE: 130 MMHG | HEART RATE: 72 BPM | DIASTOLIC BLOOD PRESSURE: 80 MMHG | HEIGHT: 68 IN | OXYGEN SATURATION: 97 %

## 2024-01-09 DIAGNOSIS — Z13.220 ENCOUNTER FOR SCREENING FOR LIPID DISORDER: ICD-10-CM

## 2024-01-09 DIAGNOSIS — I25.10 CORONARY ARTERY DISEASE INVOLVING NATIVE CORONARY ARTERY OF NATIVE HEART WITHOUT ANGINA PECTORIS: ICD-10-CM

## 2024-01-09 DIAGNOSIS — E78.5 HYPERLIPIDEMIA, UNSPECIFIED HYPERLIPIDEMIA TYPE: ICD-10-CM

## 2024-01-09 DIAGNOSIS — Z12.5 SCREENING PSA (PROSTATE SPECIFIC ANTIGEN): ICD-10-CM

## 2024-01-09 DIAGNOSIS — E11.9 TYPE 2 DIABETES MELLITUS WITHOUT COMPLICATION, WITHOUT LONG-TERM CURRENT USE OF INSULIN (HCC): Primary | ICD-10-CM

## 2024-01-09 DIAGNOSIS — R10.13 EPIGASTRIC PAIN: ICD-10-CM

## 2024-01-09 LAB
ALBUMIN SERPL-MCNC: 4.6 G/DL (ref 3.5–5.2)
ALP SERPL-CCNC: 210 U/L (ref 40–130)
ALT SERPL-CCNC: 22 U/L (ref 5–41)
AMYLASE SERPL-CCNC: 44 U/L (ref 28–100)
ANION GAP SERPL CALCULATED.3IONS-SCNC: 19 MMOL/L (ref 7–19)
AST SERPL-CCNC: 13 U/L (ref 5–40)
BILIRUB SERPL-MCNC: 1.8 MG/DL (ref 0.2–1.2)
BUN SERPL-MCNC: 10 MG/DL (ref 6–20)
CALCIUM SERPL-MCNC: 9.7 MG/DL (ref 8.6–10)
CHLORIDE SERPL-SCNC: 97 MMOL/L (ref 98–111)
CHOLEST SERPL-MCNC: 216 MG/DL (ref 160–199)
CO2 SERPL-SCNC: 23 MMOL/L (ref 22–29)
CREAT SERPL-MCNC: 0.7 MG/DL (ref 0.5–1.2)
EKG P AXIS: 42 DEGREES
EKG P-R INTERVAL: 146 MS
EKG Q-T INTERVAL: 452 MS
EKG QRS DURATION: 96 MS
EKG QTC CALCULATION (BAZETT): 466 MS
EKG T AXIS: -21 DEGREES
GLUCOSE SERPL-MCNC: 218 MG/DL (ref 74–109)
HBA1C MFR BLD: 8 % (ref 4–6)
HDLC SERPL-MCNC: 50 MG/DL (ref 55–121)
LDLC SERPL CALC-MCNC: 133 MG/DL
LIPASE SERPL-CCNC: 80 U/L (ref 13–60)
POTASSIUM SERPL-SCNC: 4.4 MMOL/L (ref 3.5–5)
PROT SERPL-MCNC: 8.1 G/DL (ref 6.6–8.7)
PSA SERPL-MCNC: 4.26 NG/ML (ref 0–4)
SODIUM SERPL-SCNC: 139 MMOL/L (ref 136–145)
TRIGL SERPL-MCNC: 163 MG/DL (ref 0–149)

## 2024-01-09 PROCEDURE — 3052F HG A1C>EQUAL 8.0%<EQUAL 9.0%: CPT | Performed by: NURSE PRACTITIONER

## 2024-01-09 PROCEDURE — 99214 OFFICE O/P EST MOD 30 MIN: CPT | Performed by: NURSE PRACTITIONER

## 2024-01-09 PROCEDURE — 3079F DIAST BP 80-89 MM HG: CPT | Performed by: NURSE PRACTITIONER

## 2024-01-09 PROCEDURE — 3075F SYST BP GE 130 - 139MM HG: CPT | Performed by: NURSE PRACTITIONER

## 2024-01-09 PROCEDURE — 93010 ELECTROCARDIOGRAM REPORT: CPT | Performed by: INTERNAL MEDICINE

## 2024-01-09 RX ORDER — LISINOPRIL 5 MG/1
5 TABLET ORAL DAILY
COMMUNITY
End: 2024-01-09 | Stop reason: ALTCHOICE

## 2024-01-09 RX ORDER — OMEPRAZOLE 40 MG/1
40 CAPSULE, DELAYED RELEASE ORAL DAILY
COMMUNITY

## 2024-01-09 RX ORDER — ORAL SEMAGLUTIDE 3 MG/1
1 TABLET ORAL DAILY
COMMUNITY
End: 2024-01-09 | Stop reason: SINTOL

## 2024-01-09 RX ORDER — ATORVASTATIN CALCIUM 80 MG/1
80 TABLET, FILM COATED ORAL DAILY
COMMUNITY
Start: 2023-11-08

## 2024-01-09 SDOH — ECONOMIC STABILITY: INCOME INSECURITY: HOW HARD IS IT FOR YOU TO PAY FOR THE VERY BASICS LIKE FOOD, HOUSING, MEDICAL CARE, AND HEATING?: NOT HARD AT ALL

## 2024-01-09 SDOH — ECONOMIC STABILITY: FOOD INSECURITY: WITHIN THE PAST 12 MONTHS, YOU WORRIED THAT YOUR FOOD WOULD RUN OUT BEFORE YOU GOT MONEY TO BUY MORE.: NEVER TRUE

## 2024-01-09 SDOH — ECONOMIC STABILITY: FOOD INSECURITY: WITHIN THE PAST 12 MONTHS, THE FOOD YOU BOUGHT JUST DIDN'T LAST AND YOU DIDN'T HAVE MONEY TO GET MORE.: NEVER TRUE

## 2024-01-09 SDOH — ECONOMIC STABILITY: HOUSING INSECURITY
IN THE LAST 12 MONTHS, WAS THERE A TIME WHEN YOU DID NOT HAVE A STEADY PLACE TO SLEEP OR SLEPT IN A SHELTER (INCLUDING NOW)?: NO

## 2024-01-09 ASSESSMENT — PATIENT HEALTH QUESTIONNAIRE - PHQ9
2. FEELING DOWN, DEPRESSED OR HOPELESS: 0
SUM OF ALL RESPONSES TO PHQ9 QUESTIONS 1 & 2: 0
SUM OF ALL RESPONSES TO PHQ QUESTIONS 1-9: 0
SUM OF ALL RESPONSES TO PHQ QUESTIONS 1-9: 0
1. LITTLE INTEREST OR PLEASURE IN DOING THINGS: 0
SUM OF ALL RESPONSES TO PHQ QUESTIONS 1-9: 0
SUM OF ALL RESPONSES TO PHQ QUESTIONS 1-9: 0

## 2024-01-09 ASSESSMENT — ENCOUNTER SYMPTOMS
VOMITING: 0
ALLERGIC/IMMUNOLOGIC NEGATIVE: 1
EYES NEGATIVE: 1
NAUSEA: 1
CONSTIPATION: 1
ABDOMINAL PAIN: 1
RESPIRATORY NEGATIVE: 1

## 2024-01-09 NOTE — PROGRESS NOTES
[facility]  [address] [place] [address]  Dept: [phone]  Dept Fax: [phone]  Loc: [phone]    Judah Vigil is a 58 y.o. male who presents today for his medical conditions/complaints as noted below.  Judah Vigil is c/o of Diabetes (Blood sugar is really high, went to ER on Sunday with pain in his abdomen. They did a CT and showed a hiatal hernia and told him he had acid reflux, his FBS on Monday was 320)        HPI:     HPI   Chief Complaint   Patient presents with    Diabetes     Blood sugar is really high, went to ER on Sunday with pain in his abdomen. They did a CT and showed a hiatal hernia and told him he had acid reflux, his FBS on Monday was 320   Sees cardiology at [facility].  He has been seeing [facility] internal medicine some as well and he had started him on Rybelsus.  The stomach issues started about the same time.  He is only been on the 3 mg of Rybelsus until just a couple of days ago when his sugar was running 400 he took 2 of them.  Before that he had been on metformin but he does not remember if he had any side effects.  He is also been having constipation and bloating since starting Rybelsus.  He was told at the ER it might be a hiatal hernia.  His fasting blood sugar has been anywhere from 1 .  He has had some sugars over 400  Lab Results   Component Value Date/Time    GLUCOSE 218 01/09/2024 09:29 AM    GLUCOSE 304 01/07/2024 05:41 PM    GLUCOSE 135 05/26/2022 08:07 AM    LABA1C 8.0 01/09/2024 09:29 AM    LABA1C 6.6 05/26/2022 08:07 AM    LABA1C 6.9 03/25/2021 10:43 AM     Past Medical History:   Diagnosis Date    CAD (coronary artery disease)     Chest pain     Diabetes mellitus (HCC)     GERD (gastroesophageal reflux disease)     History of atherosclerotic cardiovascular disease     Hyperlipidemia     Hypertension     Pacemaker     Syncope       Past Surgical History:   Procedure Laterality Date    COLONOSCOPY N/A

## 2024-01-09 NOTE — PATIENT INSTRUCTIONS
Stop the rybelsus and lisinopril  Monitor bs in am fasting and should be under 140, 2 hours after any meal under 180  Miralax now and repeat dose in 2 hours, if no bm tonight do repeat in morning.   Once clean out start stool softener every day  Till bm only clear liq or yogart soft food  Low acid foods.   Metformin 500mg twice a day for now then if tolerating no diarrhea after a week then increase to 1000mg twice day  Restart farxiga if 5 mg will likely need 10mg, urination increase.  May have to add meds later for sugar.   Stop protonix, nexium and reglan and do only the omeprazole  Zofran for nausea  Monitor your blood pressure every a.m And once random during the day.  Record them.  The goal is top number under 140 and bottom number under 80.  If over this after 3 days will increase the metoprolol to 25mg twice  aday.

## 2024-02-09 ENCOUNTER — OFFICE VISIT (OUTPATIENT)
Dept: PRIMARY CARE CLINIC | Age: 59
End: 2024-02-09
Payer: COMMERCIAL

## 2024-02-09 VITALS
TEMPERATURE: 97 F | DIASTOLIC BLOOD PRESSURE: 76 MMHG | SYSTOLIC BLOOD PRESSURE: 115 MMHG | BODY MASS INDEX: 25.48 KG/M2 | HEIGHT: 69 IN | OXYGEN SATURATION: 98 % | HEART RATE: 83 BPM | WEIGHT: 172 LBS | RESPIRATION RATE: 16 BRPM

## 2024-02-09 DIAGNOSIS — R74.8 ELEVATED LIPASE: ICD-10-CM

## 2024-02-09 DIAGNOSIS — G47.00 INSOMNIA, UNSPECIFIED TYPE: ICD-10-CM

## 2024-02-09 DIAGNOSIS — E11.9 TYPE 2 DIABETES MELLITUS WITHOUT COMPLICATION, WITHOUT LONG-TERM CURRENT USE OF INSULIN (HCC): Primary | ICD-10-CM

## 2024-02-09 DIAGNOSIS — R74.8 ELEVATED LIVER ENZYMES: ICD-10-CM

## 2024-02-09 LAB
ALBUMIN SERPL-MCNC: 4.8 G/DL (ref 3.5–5.2)
ALP SERPL-CCNC: 152 U/L (ref 40–130)
ALT SERPL-CCNC: 25 U/L (ref 5–41)
ANION GAP SERPL CALCULATED.3IONS-SCNC: 18 MMOL/L (ref 7–19)
AST SERPL-CCNC: 20 U/L (ref 5–40)
BILIRUB SERPL-MCNC: 2.1 MG/DL (ref 0.2–1.2)
BUN SERPL-MCNC: 16 MG/DL (ref 6–20)
CALCIUM SERPL-MCNC: 9.6 MG/DL (ref 8.6–10)
CHLORIDE SERPL-SCNC: 105 MMOL/L (ref 98–111)
CO2 SERPL-SCNC: 20 MMOL/L (ref 22–29)
CREAT SERPL-MCNC: 0.5 MG/DL (ref 0.5–1.2)
GLUCOSE SERPL-MCNC: 127 MG/DL (ref 74–109)
LIPASE SERPL-CCNC: 29 U/L (ref 13–60)
POTASSIUM SERPL-SCNC: 4.5 MMOL/L (ref 3.5–5)
PROT SERPL-MCNC: 7.4 G/DL (ref 6.6–8.7)
SODIUM SERPL-SCNC: 143 MMOL/L (ref 136–145)

## 2024-02-09 PROCEDURE — 3078F DIAST BP <80 MM HG: CPT | Performed by: NURSE PRACTITIONER

## 2024-02-09 PROCEDURE — 3074F SYST BP LT 130 MM HG: CPT | Performed by: NURSE PRACTITIONER

## 2024-02-09 PROCEDURE — 3052F HG A1C>EQUAL 8.0%<EQUAL 9.0%: CPT | Performed by: NURSE PRACTITIONER

## 2024-02-09 PROCEDURE — 99213 OFFICE O/P EST LOW 20 MIN: CPT | Performed by: NURSE PRACTITIONER

## 2024-02-09 RX ORDER — ZOLPIDEM TARTRATE 10 MG/1
10 TABLET ORAL NIGHTLY
Qty: 90 TABLET | Refills: 0 | Status: SHIPPED | OUTPATIENT
Start: 2024-02-09 | End: 2024-05-09

## 2024-02-09 RX ORDER — DAPAGLIFLOZIN 10 MG/1
TABLET, FILM COATED ORAL
COMMUNITY
Start: 2024-02-08

## 2024-02-09 NOTE — PROGRESS NOTES
found                     Patient given educational materials -see patient instructions.  Discussed use, benefit, and side effects of prescribed medications.  All patient questions answered.  Pt voiced understanding. Reviewed health maintenance.  Instructed to continue currentmedications, diet and exercise.  Patient agreed with treatment plan. Follow up as directed.   MEDICATIONS:  Orders Placed This Encounter   Medications    zolpidem (AMBIEN) 10 MG tablet     Sig: Take 1 tablet by mouth nightly for 90 days. Max Daily Amount: 10 mg     Dispense:  90 tablet     Refill:  0         ORDERS:  Orders Placed This Encounter   Procedures    Lipase    Comprehensive Metabolic Panel       Follow-up:  Return in about 3 months (around 5/9/2024) for labs only psa and a1c, 6months diabetes.    PATIENT INSTRUCTIONS:  Patient Instructions   Repeat A1c and psa in 3 months lab only  Continue the metformin at 1000mg twice a day , fargixa 5mg daily  Blood sugar fasting goal is under 140  If psa is higher or urinary symptoms send to [facility] [place] [facility].   Continue ambien   Electronically signed by JA Persaud CNP on 2/9/2024 at 5:39 PM    EMR Dragon/transcription disclaimer:  Much of thisencounter note is electronic transcription/translation of spoken language to printed texts.  The electronic translation of spoken language may be erroneous, or at times, nonsensical words or phrases may be inadvertentlytranscribed.  Although I have reviewed the note for such errors, some may still exist.

## 2024-05-10 ENCOUNTER — NURSE ONLY (OUTPATIENT)
Dept: PRIMARY CARE CLINIC | Age: 59
End: 2024-05-10

## 2024-05-10 DIAGNOSIS — Z12.5 SCREENING PSA (PROSTATE SPECIFIC ANTIGEN): ICD-10-CM

## 2024-05-10 DIAGNOSIS — R97.20 ELEVATED PSA: ICD-10-CM

## 2024-05-10 DIAGNOSIS — E11.9 TYPE 2 DIABETES MELLITUS WITHOUT COMPLICATION, WITHOUT LONG-TERM CURRENT USE OF INSULIN (HCC): ICD-10-CM

## 2024-05-10 DIAGNOSIS — E11.9 TYPE 2 DIABETES MELLITUS WITHOUT COMPLICATION, WITHOUT LONG-TERM CURRENT USE OF INSULIN (HCC): Primary | ICD-10-CM

## 2024-05-10 LAB
CREAT UR-MCNC: 90.7 MG/DL (ref 39–259)
HBA1C MFR BLD: 7 % (ref 4–6)
MICROALBUMIN UR-MCNC: <1.2 MG/DL (ref 0–19)
MICROALBUMIN/CREAT UR-RTO: NORMAL MG/G
PSA SERPL-MCNC: 3.04 NG/ML (ref 0–4)

## 2024-08-15 ENCOUNTER — OFFICE VISIT (OUTPATIENT)
Dept: PRIMARY CARE CLINIC | Age: 59
End: 2024-08-15
Payer: COMMERCIAL

## 2024-08-15 VITALS
SYSTOLIC BLOOD PRESSURE: 124 MMHG | BODY MASS INDEX: 25.64 KG/M2 | HEART RATE: 75 BPM | OXYGEN SATURATION: 97 % | DIASTOLIC BLOOD PRESSURE: 78 MMHG | WEIGHT: 173.6 LBS | TEMPERATURE: 97.6 F

## 2024-08-15 DIAGNOSIS — E78.5 HYPERLIPIDEMIA, UNSPECIFIED HYPERLIPIDEMIA TYPE: ICD-10-CM

## 2024-08-15 DIAGNOSIS — I25.10 CORONARY ARTERY DISEASE INVOLVING NATIVE CORONARY ARTERY OF NATIVE HEART WITHOUT ANGINA PECTORIS: ICD-10-CM

## 2024-08-15 DIAGNOSIS — E11.9 TYPE 2 DIABETES MELLITUS WITHOUT COMPLICATION, WITHOUT LONG-TERM CURRENT USE OF INSULIN (HCC): Primary | ICD-10-CM

## 2024-08-15 LAB
ALBUMIN SERPL-MCNC: 5.1 G/DL (ref 3.5–5.2)
ALP SERPL-CCNC: 166 U/L (ref 40–130)
ALT SERPL-CCNC: 28 U/L (ref 5–41)
ANION GAP SERPL CALCULATED.3IONS-SCNC: 14 MMOL/L (ref 7–19)
AST SERPL-CCNC: 19 U/L (ref 5–40)
BILIRUB SERPL-MCNC: 2.6 MG/DL (ref 0.2–1.2)
BUN SERPL-MCNC: 14 MG/DL (ref 6–20)
CALCIUM SERPL-MCNC: 9.9 MG/DL (ref 8.6–10)
CHLORIDE SERPL-SCNC: 99 MMOL/L (ref 98–111)
CHOLEST SERPL-MCNC: 203 MG/DL (ref 160–199)
CO2 SERPL-SCNC: 26 MMOL/L (ref 22–29)
CREAT SERPL-MCNC: 0.7 MG/DL (ref 0.5–1.2)
GLUCOSE SERPL-MCNC: 134 MG/DL (ref 74–109)
HBA1C MFR BLD: 6.8 % (ref 4–6)
HDLC SERPL-MCNC: 47 MG/DL (ref 55–121)
LDLC SERPL CALC-MCNC: 92 MG/DL
POTASSIUM SERPL-SCNC: 4.4 MMOL/L (ref 3.5–5)
PROT SERPL-MCNC: 7.9 G/DL (ref 6.6–8.7)
SODIUM SERPL-SCNC: 139 MMOL/L (ref 136–145)
TRIGL SERPL-MCNC: 322 MG/DL (ref 0–149)

## 2024-08-15 PROCEDURE — 99214 OFFICE O/P EST MOD 30 MIN: CPT | Performed by: NURSE PRACTITIONER

## 2024-08-15 PROCEDURE — 3044F HG A1C LEVEL LT 7.0%: CPT | Performed by: NURSE PRACTITIONER

## 2024-08-15 PROCEDURE — 3074F SYST BP LT 130 MM HG: CPT | Performed by: NURSE PRACTITIONER

## 2024-08-15 PROCEDURE — 3078F DIAST BP <80 MM HG: CPT | Performed by: NURSE PRACTITIONER

## 2024-08-15 RX ORDER — ZOLPIDEM TARTRATE 10 MG/1
TABLET ORAL
COMMUNITY
Start: 2024-08-12

## 2024-08-15 RX ORDER — DAPAGLIFLOZIN 10 MG/1
10 TABLET, FILM COATED ORAL EVERY MORNING
Qty: 90 TABLET | Refills: 3 | Status: SHIPPED | OUTPATIENT
Start: 2024-08-15

## 2024-08-15 ASSESSMENT — ENCOUNTER SYMPTOMS
GASTROINTESTINAL NEGATIVE: 1
ALLERGIC/IMMUNOLOGIC NEGATIVE: 1
EYES NEGATIVE: 1
RESPIRATORY NEGATIVE: 1

## 2024-08-15 NOTE — PROGRESS NOTES
[facility]  [place] [id number] [facility]  [place] [address]  Dept: [phone]  Dept Fax: [phone]  Loc: [phone]    Judah Vigil is a 59 y.o. male who presents today for his medical conditions/complaints as noted below.  Judah Vigil is c/o of 6 Month Follow-Up (Is fasting for labs. Is not checking glucose at home often. Needs diabetic foot exam today. Says brilinta is too expensive, wants to discuss alternatives )        HPI:     HPI   Chief Complaint   Patient presents with    6 Month Follow-Up     Is fasting for labs. Is not checking glucose at home often. Needs diabetic foot exam today. Says brilinta is too expensive, wants to discuss alternatives    He sees dr Cary last saw him in Nov 2023.  He said the Brilinta is costing him about $50 a month and he would like to try something cheaper.  In the past he is try to get off things like this and he ends up having heart trouble again so he would like to stay on something.  He admits he has not checked his sugar very often but he is taking his Farxiga and metformin.  Lab Results   Component Value Date/Time    GLUCOSE 134 08/15/2024 09:59 AM    GLUCOSE 127 02/09/2024 08:32 AM    GLUCOSE 218 01/09/2024 09:29 AM    LABA1C 6.8 08/15/2024 09:59 AM    LABA1C 7.0 05/10/2024 07:58 AM    LABA1C 8.0 01/09/2024 09:29 AM     Past Medical History:   Diagnosis Date    CAD (coronary artery disease)     Chest pain     Diabetes mellitus (HCC)     GERD (gastroesophageal reflux disease)     History of atherosclerotic cardiovascular disease     Hyperlipidemia     Hypertension     Pacemaker     Syncope       Past Surgical History:   Procedure Laterality Date    COLONOSCOPY N/A 03/17/2023    Dr AN Rivera-Normal, 5 yr recall    CORONARY ANGIOPLASTY WITH STENT PLACEMENT      PACEMAKER PLACEMENT      HI COLONOSCOPY W/BIOPSY SINGLE/MULTIPLE N/A 12/04/2017    Dr AN Rivera-Diverticular disease-Tubular AP (-) dysplasia-5 yr recall    HI EGD TRANSORAL

## 2024-10-01 ENCOUNTER — OFFICE VISIT (OUTPATIENT)
Dept: CARDIOLOGY | Facility: CLINIC | Age: 59
End: 2024-10-01
Payer: COMMERCIAL

## 2024-10-01 VITALS
BODY MASS INDEX: 26.22 KG/M2 | HEIGHT: 68 IN | HEART RATE: 78 BPM | DIASTOLIC BLOOD PRESSURE: 70 MMHG | WEIGHT: 173 LBS | SYSTOLIC BLOOD PRESSURE: 110 MMHG

## 2024-10-01 DIAGNOSIS — E78.2 MIXED HYPERLIPIDEMIA: ICD-10-CM

## 2024-10-01 DIAGNOSIS — Z95.0 PACEMAKER: ICD-10-CM

## 2024-10-01 DIAGNOSIS — E11.65 TYPE 2 DIABETES MELLITUS WITH HYPERGLYCEMIA, WITHOUT LONG-TERM CURRENT USE OF INSULIN: Chronic | ICD-10-CM

## 2024-10-01 DIAGNOSIS — I25.10 CORONARY ARTERY DISEASE INVOLVING NATIVE CORONARY ARTERY OF NATIVE HEART WITHOUT ANGINA PECTORIS: Primary | ICD-10-CM

## 2024-10-01 DIAGNOSIS — I49.5 SICK SINUS SYNDROME: ICD-10-CM

## 2024-10-01 PROBLEM — E78.5 HYPERLIPIDEMIA: Chronic | Status: ACTIVE | Noted: 2018-11-29

## 2024-10-01 PROBLEM — E11.9 DIABETES MELLITUS: Chronic | Status: ACTIVE | Noted: 2018-11-29

## 2024-10-01 PROCEDURE — 93000 ELECTROCARDIOGRAM COMPLETE: CPT | Performed by: NURSE PRACTITIONER

## 2024-10-01 PROCEDURE — 99214 OFFICE O/P EST MOD 30 MIN: CPT | Performed by: NURSE PRACTITIONER

## 2024-10-01 RX ORDER — ATORVASTATIN CALCIUM 80 MG/1
80 TABLET, FILM COATED ORAL NIGHTLY
Qty: 90 TABLET | Refills: 3 | Status: SHIPPED | OUTPATIENT
Start: 2024-10-01

## 2024-10-01 NOTE — PROGRESS NOTES
Subjective:     Encounter Date:10/01/2024      Patient ID: Fred Atkins is a 59 y.o. male with known coronary artery disease status post prior PCI to the LAD and RCA, sick sinus syndrome status post pacemaker placement, hypertension, type 2 diabetes mellitus, who presents to the office today for routine cardiac follow-up.    Chief Complaint: Routine follow-up  Coronary Artery Disease  Presents for follow-up visit. Pertinent negatives include no chest pain, chest pressure, chest tightness, dizziness, leg swelling, palpitations or shortness of breath. The symptoms have been stable. Compliance with diet is good. Compliance with exercise is good. Compliance with medications is good.     Mr. Atkins presents today for follow up.  Standpoint.  He continues to follow with his primary care office routinely.  He is managed on medications for diabetes and reports good control of his blood sugars.  He denies any chest pain, chest pressure, chest discomfort.  He denies any shortness of breath, dyspnea on exertion, orthopnea, PND.  He stays fairly active exercising frequently and hunting often.  He tolerates walking long distances and generally has no issues whatsoever when participating in heavy exertion.  He had previously not been taking his cholesterol medication routinely as he had forgotten to refill the medicine after it had .  His lipids have improved since resuming medication daily however LDL is still not to goal.  He denies any significant side effects from any of his current medications.  He is on dual antiplatelet therapy with aspirin and Brilinta and denies any bleeding issues.    The following portions of the patient's history were reviewed and updated as appropriate: allergies, current medications, past family history, past medical history, past social history, and past surgical history.    Allergies   Allergen Reactions    Semaglutide(0.25 Or 0.5mg-Dos) Other (See Comments)     pancreatitis          Current Outpatient Medications:     aspirin 81 MG EC tablet, Take 1 tablet by mouth Daily., Disp: , Rfl:     atorvastatin (LIPITOR) 40 MG tablet, Take 1 tablet by mouth Every Night., Disp: , Rfl:     dapagliflozin Propanediol (Farxiga) 10 MG tablet, Take 10 mg by mouth Daily., Disp: 90 tablet, Rfl: 3    metFORMIN (GLUCOPHAGE) 500 MG tablet, Take 1 tablet by mouth 2 (Two) Times a Day With Meals., Disp: , Rfl:     metoprolol tartrate (LOPRESSOR) 25 MG tablet, Take 1 tablet by mouth Daily., Disp: 90 tablet, Rfl: 3    nitroglycerin (NITROSTAT) 0.4 MG SL tablet, Place 1 tablet under the tongue As Needed for Chest Pain., Disp: 25 tablet, Rfl: 2    omeprazole (priLOSEC) 20 MG capsule, Take 1 capsule by mouth Daily As Needed., Disp: , Rfl:     ticagrelor (BRILINTA) 60 MG tablet tablet, Take 1 tablet by mouth 2 (Two) Times a Day., Disp: 180 tablet, Rfl: 3    zolpidem (AMBIEN) 5 MG tablet, Take 1 tablet by mouth At Night As Needed for Sleep., Disp: , Rfl:       Review of Systems   Constitutional: Negative for diaphoresis, fever and malaise/fatigue.   HENT:  Negative for congestion.    Eyes:  Negative for visual disturbance.   Cardiovascular:  Negative for chest pain, dyspnea on exertion, leg swelling, near-syncope, orthopnea, palpitations, paroxysmal nocturnal dyspnea and syncope.   Respiratory:  Negative for chest tightness, cough, shortness of breath and wheezing.    Hematologic/Lymphatic: Negative for bleeding problem.   Gastrointestinal:  Negative for nausea and vomiting.   Neurological:  Negative for dizziness, focal weakness, light-headedness and weakness.   Psychiatric/Behavioral:  Negative for altered mental status.          ECG 12 Lead    Date/Time: 10/1/2024 2:05 PM  Performed by: Liudmila Laureano APRN    Authorized by: Liudmila Laureano APRN  Comparison: compared with previous ECG from 9/29/2023  Similar to previous ECG  Rhythm: sinus rhythm  Rate: normal  BPM: 78  Conduction: conduction normal  ST Segments:  "ST segments normal  T Waves: T waves normal  QRS axis: right    Clinical impression: non-specific ECG             Objective:     Vitals reviewed.   Constitutional:       General: Awake.      Appearance: Normal and healthy appearance. Well-developed, well-groomed, overweight and not in distress.   HENT:      Head: Normocephalic and atraumatic.   Pulmonary:      Effort: Pulmonary effort is normal.      Breath sounds: Normal breath sounds. No wheezing. No rhonchi. No rales.   Cardiovascular:      Normal rate. Regular rhythm.      Murmurs: There is no murmur.      No gallop.  No rub.   Edema:     Peripheral edema absent.   Musculoskeletal:      Cervical back: Normal range of motion and neck supple. Skin:     General: Skin is warm and dry.   Neurological:      Mental Status: Alert, oriented to person, place, and time and oriented to person, place and time.   Psychiatric:         Attention and Perception: Attention normal.         Mood and Affect: Mood normal.         Speech: Speech normal.         Behavior: Behavior normal. Behavior is cooperative.         Thought Content: Thought content normal.         Cognition and Memory: Cognition and memory normal.         Judgment: Judgment normal.         /70   Pulse 78   Ht 172.7 cm (68\")   Wt 78.5 kg (173 lb)   BMI 26.30 kg/m²     Lab Review:   In office device check 10/1/2024  [employer] dual-chamber device  Atrial pacing 14%, ventricular pacing less than 1%  3 brief episodes of supraventricular tachycardia with 1 to 2-second duration.  Rates 160-190  Battery life: Estimated 14.4 years      Lab Results   Component Value Date    CHLPL 216 (H) 01/09/2024    TRIG 163 (H) 01/09/2024    HDL 50 (L) 01/09/2024     01/09/2024     Lab Results   Component Value Date    GLUCOSE 134 (H) 08/15/2024    BUN 14 08/15/2024    CREATININE 0.7 08/15/2024     08/15/2024    K 4.4 08/15/2024    CL 99 08/15/2024    CALCIUM 9.9 08/15/2024    PROTEINTOT 7.9 08/15/2024    ALBUMIN " 5.1 08/15/2024    ALT 28 08/15/2024    AST 19 08/15/2024    ALKPHOS 166 (H) 08/15/2024    BILITOT 2.6 (H) 08/15/2024    ANIONGAP 14 08/15/2024    EGFR >60 04/04/2015       Cardiac Cath: 4/2015 (Dr. Le)    CONCLUSION:  1.  Successful stent placement to the right posterolateral coronary artery.  2.  Widely patent stents.  3.  Nonobstructive disease in the diagonal.  4.  Normal left ventricular function.     FINAL COMMENT:  The patient tolerated the procedure well.  Hemostasis was achieved with a 6 [place] Perclose hemostatic device.       Assessment:          Diagnosis Plan   1. Coronary artery disease involving native coronary artery of native heart without angina pectoris        2. Sick sinus syndrome        3. Pacemaker        4. Mixed hyperlipidemia        5. Type 2 diabetes mellitus with hyperglycemia, without long-term current use of insulin               Plan:       -Coronary artery disease: Prior PCI to LAD and RCA.  He has been managed on dual antiplatelet therapy with aspirin and low-dose Brilinta.  He denies any symptoms at this time.  His most recent ischemic evaluation was performed by Dr. Le.  This is referenced above.  He is also managed on statin and beta-blocker therapy.  Continue current management and routine follow-ups.    -Sick sinus syndrome/pacemaker: Device interrogation in office today which was stable.  Previously implanted when he followed at [facility] with Dr. Cam.    -Hyperlipidemia: Uncontrolled. He is on statin therapy for management of cholesterol and has been for some time. He still has elevated cholesterol on most recent fasting lab. Increase statin therapy to 80 mg daily.     -Diabetes Mellitus: Type II. managed on oral agents hgb a1 7% improved from previous.       Increase Lipitor to 80 mg daily.   Repeat Labs 3-6 months with primary care  Follow Up - 1 year.

## 2024-10-20 ENCOUNTER — HOSPITAL ENCOUNTER (EMERGENCY)
Facility: HOSPITAL | Age: 59
Discharge: HOME | End: 2024-10-20
Attending: FAMILY MEDICINE | Admitting: FAMILY MEDICINE
Payer: COMMERCIAL

## 2024-10-20 ENCOUNTER — APPOINTMENT (OUTPATIENT)
Dept: GENERAL RADIOLOGY | Facility: HOSPITAL | Age: 59
End: 2024-10-20
Payer: COMMERCIAL

## 2024-10-20 ENCOUNTER — APPOINTMENT (OUTPATIENT)
Dept: CT IMAGING | Facility: HOSPITAL | Age: 59
End: 2024-10-20
Payer: COMMERCIAL

## 2024-10-20 VITALS
TEMPERATURE: 98 F | OXYGEN SATURATION: 98 % | SYSTOLIC BLOOD PRESSURE: 128 MMHG | HEIGHT: 68 IN | BODY MASS INDEX: 26.98 KG/M2 | WEIGHT: 178 LBS | DIASTOLIC BLOOD PRESSURE: 78 MMHG | RESPIRATION RATE: 20 BRPM | HEART RATE: 68 BPM

## 2024-10-20 DIAGNOSIS — R07.89 ATYPICAL CHEST PAIN: ICD-10-CM

## 2024-10-20 DIAGNOSIS — M54.89 OTHER BACK PAIN, UNSPECIFIED CHRONICITY: Primary | ICD-10-CM

## 2024-10-20 LAB
ALBUMIN SERPL-MCNC: 4.3 G/DL (ref 3.5–5.2)
ALBUMIN/GLOB SERPL: 1.7 G/DL
ALP SERPL-CCNC: 292 U/L (ref 39–117)
ALT SERPL W P-5'-P-CCNC: 98 U/L (ref 1–41)
ANION GAP SERPL CALCULATED.3IONS-SCNC: 9 MMOL/L (ref 5–15)
APTT PPP: 27.1 SECONDS (ref 24.5–36)
AST SERPL-CCNC: 131 U/L (ref 1–40)
BASOPHILS # BLD AUTO: 0.04 10*3/MM3 (ref 0–0.2)
BASOPHILS NFR BLD AUTO: 0.5 % (ref 0–1.5)
BILIRUB SERPL-MCNC: 1.6 MG/DL (ref 0–1.2)
BUN SERPL-MCNC: 13 MG/DL (ref 6–20)
BUN/CREAT SERPL: 20 (ref 7–25)
CALCIUM SPEC-SCNC: 8.7 MG/DL (ref 8.6–10.5)
CHLORIDE SERPL-SCNC: 98 MMOL/L (ref 98–107)
CO2 SERPL-SCNC: 28 MMOL/L (ref 22–29)
CREAT SERPL-MCNC: 0.65 MG/DL (ref 0.76–1.27)
D DIMER PPP FEU-MCNC: 0.56 MCGFEU/ML (ref 0–0.59)
DEPRECATED RDW RBC AUTO: 39.8 FL (ref 37–54)
EGFRCR SERPLBLD CKD-EPI 2021: 108.5 ML/MIN/1.73
EOSINOPHIL # BLD AUTO: 0.06 10*3/MM3 (ref 0–0.4)
EOSINOPHIL NFR BLD AUTO: 0.7 % (ref 0.3–6.2)
ERYTHROCYTE [DISTWIDTH] IN BLOOD BY AUTOMATED COUNT: 13.2 % (ref 12.3–15.4)
GEN 5 2HR TROPONIN T REFLEX: 7 NG/L
GLOBULIN UR ELPH-MCNC: 2.5 GM/DL
GLUCOSE SERPL-MCNC: 183 MG/DL (ref 65–99)
HCT VFR BLD AUTO: 41.8 % (ref 37.5–51)
HGB BLD-MCNC: 15 G/DL (ref 13–17.7)
HOLD SPECIMEN: NORMAL
HOLD SPECIMEN: NORMAL
IMM GRANULOCYTES # BLD AUTO: 0.02 10*3/MM3 (ref 0–0.05)
IMM GRANULOCYTES NFR BLD AUTO: 0.2 % (ref 0–0.5)
INR PPP: 0.89 (ref 0.91–1.09)
LIPASE SERPL-CCNC: 57 U/L (ref 13–60)
LYMPHOCYTES # BLD AUTO: 0.64 10*3/MM3 (ref 0.7–3.1)
LYMPHOCYTES NFR BLD AUTO: 7.2 % (ref 19.6–45.3)
MAGNESIUM SERPL-MCNC: 2.2 MG/DL (ref 1.6–2.6)
MCH RBC QN AUTO: 29.8 PG (ref 26.6–33)
MCHC RBC AUTO-ENTMCNC: 35.9 G/DL (ref 31.5–35.7)
MCV RBC AUTO: 83.1 FL (ref 79–97)
MONOCYTES # BLD AUTO: 0.59 10*3/MM3 (ref 0.1–0.9)
MONOCYTES NFR BLD AUTO: 6.7 % (ref 5–12)
NEUTROPHILS NFR BLD AUTO: 7.48 10*3/MM3 (ref 1.7–7)
NEUTROPHILS NFR BLD AUTO: 84.7 % (ref 42.7–76)
NRBC BLD AUTO-RTO: 0 /100 WBC (ref 0–0.2)
PLATELET # BLD AUTO: 179 10*3/MM3 (ref 140–450)
PMV BLD AUTO: 9.7 FL (ref 6–12)
POTASSIUM SERPL-SCNC: 4.2 MMOL/L (ref 3.5–5.2)
PROT SERPL-MCNC: 6.8 G/DL (ref 6–8.5)
PROTHROMBIN TIME: 12.4 SECONDS (ref 11.8–14.8)
RBC # BLD AUTO: 5.03 10*6/MM3 (ref 4.14–5.8)
SODIUM SERPL-SCNC: 135 MMOL/L (ref 136–145)
TROPONIN T DELTA: NORMAL
TROPONIN T SERPL HS-MCNC: <6 NG/L
WBC NRBC COR # BLD AUTO: 8.83 10*3/MM3 (ref 3.4–10.8)
WHOLE BLOOD HOLD COAG: NORMAL
WHOLE BLOOD HOLD SPECIMEN: NORMAL

## 2024-10-20 PROCEDURE — 71260 CT THORAX DX C+: CPT

## 2024-10-20 PROCEDURE — 83735 ASSAY OF MAGNESIUM: CPT | Performed by: INTERNAL MEDICINE

## 2024-10-20 PROCEDURE — 93005 ELECTROCARDIOGRAM TRACING: CPT

## 2024-10-20 PROCEDURE — 85730 THROMBOPLASTIN TIME PARTIAL: CPT | Performed by: FAMILY MEDICINE

## 2024-10-20 PROCEDURE — 36415 COLL VENOUS BLD VENIPUNCTURE: CPT

## 2024-10-20 PROCEDURE — 85379 FIBRIN DEGRADATION QUANT: CPT | Performed by: FAMILY MEDICINE

## 2024-10-20 PROCEDURE — 85610 PROTHROMBIN TIME: CPT | Performed by: FAMILY MEDICINE

## 2024-10-20 PROCEDURE — 83690 ASSAY OF LIPASE: CPT | Performed by: INTERNAL MEDICINE

## 2024-10-20 PROCEDURE — 71045 X-RAY EXAM CHEST 1 VIEW: CPT

## 2024-10-20 PROCEDURE — 99285 EMERGENCY DEPT VISIT HI MDM: CPT

## 2024-10-20 PROCEDURE — 84484 ASSAY OF TROPONIN QUANT: CPT | Performed by: INTERNAL MEDICINE

## 2024-10-20 PROCEDURE — 80053 COMPREHEN METABOLIC PANEL: CPT | Performed by: INTERNAL MEDICINE

## 2024-10-20 PROCEDURE — 25510000001 IOPAMIDOL 61 % SOLUTION: Performed by: FAMILY MEDICINE

## 2024-10-20 PROCEDURE — 74177 CT ABD & PELVIS W/CONTRAST: CPT

## 2024-10-20 PROCEDURE — 85025 COMPLETE CBC W/AUTO DIFF WBC: CPT | Performed by: INTERNAL MEDICINE

## 2024-10-20 PROCEDURE — 93005 ELECTROCARDIOGRAM TRACING: CPT | Performed by: FAMILY MEDICINE

## 2024-10-20 RX ORDER — SODIUM CHLORIDE 0.9 % (FLUSH) 0.9 %
10 SYRINGE (ML) INJECTION AS NEEDED
Status: DISCONTINUED | OUTPATIENT
Start: 2024-10-20 | End: 2024-10-21 | Stop reason: HOSPADM

## 2024-10-20 RX ORDER — IOPAMIDOL 612 MG/ML
100 INJECTION, SOLUTION INTRAVASCULAR
Status: COMPLETED | OUTPATIENT
Start: 2024-10-20 | End: 2024-10-20

## 2024-10-20 RX ORDER — ASPIRIN 81 MG/1
324 TABLET, CHEWABLE ORAL ONCE
Status: COMPLETED | OUTPATIENT
Start: 2024-10-20 | End: 2024-10-20

## 2024-10-20 RX ADMIN — ASPIRIN 324 MG: 81 TABLET, CHEWABLE ORAL at 19:50

## 2024-10-20 RX ADMIN — IOPAMIDOL 100 ML: 612 INJECTION, SOLUTION INTRAVENOUS at 21:14

## 2024-10-21 LAB
QT INTERVAL: 396 MS
QTC INTERVAL: 405 MS

## 2024-10-21 NOTE — ED PROVIDER NOTES
HPI:     Patient is a 59-year-old white male who presents to the emergency room with a complaint of having right upper quadrant abdominal, right flank and back pain.  Patient states it feels like it actually starts in his back between his shoulder blades and wraps around under his right axilla and down to the right upper quadrant of his abdomen.  He states the pain was similar to when he had a gallbladder in the past but his gallbladder is no longer present.  No fevers chills or night sweats positive nausea but no vomiting or diarrhea.  Patient is concerned about his heart because he has had 4 stents in his LAD in the past.  He does have a pacemaker.  He also has a past history of pancreatitis.  Patient is supposed to take Brilinta but has not taken Brilinta for the past 2 days.  No past history of DVT or PE is noted.  Rates the pain 3 out of 10 currently in his back and axilla and right upper quadrant.  Described as sharp      REVIEW OF SYSTEMS  CONSTITUTIONAL:  No complaints of fever, chills,or weakness  EYES:  No complaints of discharge   ENT: No complaints of sore throat or ear pain  CARDIOVASCULAR:  No complaints of chest pain, palpitations, or swelling  RESPIRATORY:  No complaints of cough or shortness of breath  GI: Positive for right upper quadrant abdominal pain and epigastric pain.  MUSCULOSKELETAL: Positive for back pain between the shoulder blades and right sided rib and chest wall   SKIN:  No complaints of rash  NEUROLOGIC:  No complaints of headache, focal weakness, or sensory changes  ENDOCRINE:  No complaints of polyuria or polydipsia  LYMPHATIC:  No complaints of swollen glands  GENITOURINARY: No complaints of urinary frequency or hematuria        PAST MEDICAL HISTORY  Past Medical History:   Diagnosis Date    Atherosclerotic heart disease     Diabetes mellitus     Hyperlipidemia     Myocardial infarction     Pancreatitis     Sick sinus syndrome        FAMILY HISTORY  Family History   Problem  Relation Age of Onset    Heart disease Mother     Heart disease Father     Heart attack Father     Cancer Sister         COLON    Diabetes Brother     Heart disease Brother        SOCIAL HISTORY  Social History     Socioeconomic History    Marital status:    Tobacco Use    Smoking status: Never    Smokeless tobacco: Never   Vaping Use    Vaping status: Never Used   Substance and Sexual Activity    Alcohol use: Yes     Comment: OCCASIONALLY    Drug use: No    Sexual activity: Defer       IMMUNIZATION HISTORY  Deferred to primary care physician.    SURGICAL HISTORY  Past Surgical History:   Procedure Laterality Date    CORONARY ANGIOPLASTY WITH STENT PLACEMENT      PACEMAKER IMPLANTATION      PACEMAKER REPLACEMENT Left 11/08/2023    Procedure: PPM generator change - dual;  Surgeon: Derek Baires MD;  Location:  [facility];  Service: Cardiology;  Laterality: Left;       CURRENT MEDICATIONS    Current Facility-Administered Medications:     sodium chloride 0.9 % flush 10 mL, 10 mL, Intravenous, PRN, Bhavna Munoz DO    Current Outpatient Medications:     aspirin 81 MG EC tablet, Take 1 tablet by mouth Daily., Disp: , Rfl:     atorvastatin (LIPITOR) 80 MG tablet, Take 1 tablet by mouth Every Night., Disp: 90 tablet, Rfl: 3    dapagliflozin Propanediol (Farxiga) 10 MG tablet, Take 10 mg by mouth Daily., Disp: 90 tablet, Rfl: 3    metFORMIN (GLUCOPHAGE) 500 MG tablet, Take 1 tablet by mouth 2 (Two) Times a Day With Meals., Disp: , Rfl:     metoprolol tartrate (LOPRESSOR) 25 MG tablet, Take 1 tablet by mouth Daily., Disp: 90 tablet, Rfl: 3    nitroglycerin (NITROSTAT) 0.4 MG SL tablet, Place 1 tablet under the tongue As Needed for Chest Pain., Disp: 25 tablet, Rfl: 2    omeprazole (priLOSEC) 20 MG capsule, Take 1 capsule by mouth Daily As Needed., Disp: , Rfl:     ticagrelor (BRILINTA) 60 MG tablet tablet, Take 1 tablet by mouth 2 (Two) Times a Day., Disp: 180 tablet, Rfl: 3     "zolpidem (AMBIEN) 5 MG tablet, Take 1 tablet by mouth At Night As Needed for Sleep., Disp: , Rfl:     ALLERGIES  Allergies   Allergen Reactions    Semaglutide(0.25 Or 0.5mg-Dos) Other (See Comments)     pancreatitis           Cardiac exam    VITAL SIGNS:  /87   Pulse 67   Temp 97.8 °F (36.6 °C) (Oral)   Resp 20   Ht 172.7 cm (68\")   Wt 80.7 kg (178 lb)   SpO2 98%   BMI 27.06 kg/m²     Constitutional: Patient is alert and in no distress.  Patient with moderate right-sided back, right flank, right axilla and right upper quadrant abdominal discomfort.    ENT: There is a normal pharynx with no acute erythema or exudate and oral mucosa is moist.  Nose is clear with no drainage.  Tympanic membranes intact and nonerythemic    Respiratory: Patient is clear to auscultation bilaterally with no wheezing or rhonchi.  Chest wall is mildly tender on the right lateral chest and right axilla area.  There are no external lesions on the chest.  There is no crepitance    Cardiovascular: S1-S2 with a diastolic murmur 1 out of 6.    Abdomen: Soft, with bowel sounds are normal in all 4 quadrants.  There is right upper quadrant tenderness to palpation and epigastric tenderness to palpation.  There is no rebound or guarding noted.  There is no abdominal distention or hepatosplenomegaly.    Genitourinary: No suprapubic tenderness to palpation mild right costovertebral angle tenderness to percussion is absent on the left.    Integument: No acute lesions noted and color appears to be normal.    [place] Coma Scale: Total score 15    Neurological: Patient is alert and oriented x4 and no acute findings noted.  Speech is fluent and cognition is normal.  No evidence of acute CVA.  Cranial nerves II through XII intact.  Patient with normal motor function as well as reflexes and sensation        RADIOLOGY/PROCEDURES      CT Abdomen Pelvis With Contrast   Final Result       1.   No acute process in the abdomen or pelvis.             "   This report was signed and finalized on 10/20/2024 9:38 PM by Dr Osmar Pérez.          CT Chest With Contrast Diagnostic   Final Result   1. No evidence of acute cardiopulmonary process.   2. Lungs are clear and well expanded.   3. Prominent coronary atheromatous calcification.   4. Thoracic aorta is unremarkable.               This report was signed and finalized on 10/20/2024 9:55 PM by Dr Osmar Pérez.          XR Chest 1 View   Final Result   1.  Stable chest exam without acute process.       This report was signed and finalized on 10/20/2024 7:59 PM by Dr Osmar Pérez.                 FUTURE APPOINTMENTS     Future Appointments   Date Time Provider Department Center   10/7/2025  1:00 PM [facility]   10/7/2025  1:30 PM Liudmila Laureano APRN [facility]          EKG (reviewed and interpreted by me): Normal sinus rhythm. No acute ischemia. Heart rate 63 with no acute ST segment elevation or depression noted      HEART SCORE     Patient history  1      Slightly suspicious (0 points)    2   ECG       Nonspecific repolarization disturbance (1 point)      3   Patient age     Between 45 and 65 (1 point)    4   Risk factors (Hypercholesterolemia, Hypertension, diabetes, smoking, obesity)     More than 3 risk factors or atherosclerosis history (2 points)    5   Troponin     Less than normal limit (0 points)     6     TOTAL RISK NUMBER: 4    The three risk categories are described below:  Heart score MACE risk Recommendation  0 - 3 Low (1.7%) Discharge can be an option.  4 - 6 Intermediate (20.3%) Clinical observation and further investigations.  7 - 10 High (72.2%) Immediate invasive treatment        COURSE & MEDICAL DECISION MAKING       Patient's partial differential diagnosis can include:    Unstable angina, angina, non-STEMI, arrhythmia, pneumothorax, pulmonary embolism, aortic dissection, electrolyte abnormality,  Prinzmetal angina, costochondritis, pleurisy,  pleural effusion, pulmonary edema, panic attack, esophageal spasm, GERD, gastritis, chest spasms, and others      Troponin negative x 2 patient with no chest pain CT scan showed no aortic dissection CT abdomen pelvis showed no acute abdominal pathology.  Will discharge patient home.  Patient feels comfortable with the plan of discharge we will follow-up with his PCP outpatient.    Patient's level of risk: moderate        CRITICAL CARE    CRITICAL CARE: no  CRITICAL CARE TIME: none      Recent Results (from the past 24 hours)   ECG 12 Lead Chest Pain    Collection Time: 10/20/24  6:51 PM   Result Value Ref Range    QT Interval 396 ms    QTC Interval 405 ms   Comprehensive Metabolic Panel    Collection Time: 10/20/24  7:23 PM    Specimen: Blood   Result Value Ref Range    Glucose 183 (H) 65 - 99 mg/dL    BUN 13 6 - 20 mg/dL    Creatinine 0.65 (L) 0.76 - 1.27 mg/dL    Sodium 135 (L) 136 - 145 mmol/L    Potassium 4.2 3.5 - 5.2 mmol/L    Chloride 98 98 - 107 mmol/L    CO2 28.0 22.0 - 29.0 mmol/L    Calcium 8.7 8.6 - 10.5 mg/dL    Total Protein 6.8 6.0 - 8.5 g/dL    Albumin 4.3 3.5 - 5.2 g/dL    ALT (SGPT) 98 (H) 1 - 41 U/L    AST (SGOT) 131 (H) 1 - 40 U/L    Alkaline Phosphatase 292 (H) 39 - 117 U/L    Total Bilirubin 1.6 (H) 0.0 - 1.2 mg/dL    Globulin 2.5 gm/dL    A/G Ratio 1.7 g/dL    BUN/Creatinine Ratio 20.0 7.0 - 25.0    Anion Gap 9.0 5.0 - 15.0 mmol/L    eGFR 108.5 >60.0 mL/min/1.73   Lipase    Collection Time: 10/20/24  7:23 PM    Specimen: Blood   Result Value Ref Range    Lipase 57 13 - 60 U/L   High Sensitivity Troponin T    Collection Time: 10/20/24  7:23 PM    Specimen: Blood   Result Value Ref Range    HS Troponin T <6 <22 ng/L   Magnesium    Collection Time: 10/20/24  7:23 PM    Specimen: Blood   Result Value Ref Range    Magnesium 2.2 1.6 - 2.6 mg/dL   Green Top (Gel)    Collection Time: 10/20/24  7:23 PM   Result Value Ref Range    Extra Tube Hold for add-ons.    Lavender Top    Collection Time: 10/20/24   7:23 PM   Result Value Ref Range    Extra Tube hold for add-on    Red Top    Collection Time: 10/20/24  7:23 PM   Result Value Ref Range    Extra Tube Hold for add-ons.    Light Blue Top    Collection Time: 10/20/24  7:23 PM   Result Value Ref Range    Extra Tube Hold for add-ons.    CBC Auto Differential    Collection Time: 10/20/24  7:23 PM    Specimen: Blood   Result Value Ref Range    WBC 8.83 3.40 - 10.80 10*3/mm3    RBC 5.03 4.14 - 5.80 10*6/mm3    Hemoglobin 15.0 13.0 - 17.7 g/dL    Hematocrit 41.8 37.5 - 51.0 %    MCV 83.1 79.0 - 97.0 fL    MCH 29.8 26.6 - 33.0 pg    MCHC 35.9 (H) 31.5 - 35.7 g/dL    RDW 13.2 12.3 - 15.4 %    RDW-SD 39.8 37.0 - 54.0 fl    MPV 9.7 6.0 - 12.0 fL    Platelets 179 140 - 450 10*3/mm3    Neutrophil % 84.7 (H) 42.7 - 76.0 %    Lymphocyte % 7.2 (L) 19.6 - 45.3 %    Monocyte % 6.7 5.0 - 12.0 %    Eosinophil % 0.7 0.3 - 6.2 %    Basophil % 0.5 0.0 - 1.5 %    Immature Grans % 0.2 0.0 - 0.5 %    Neutrophils, Absolute 7.48 (H) 1.70 - 7.00 10*3/mm3    Lymphocytes, Absolute 0.64 (L) 0.70 - 3.10 10*3/mm3    Monocytes, Absolute 0.59 0.10 - 0.90 10*3/mm3    Eosinophils, Absolute 0.06 0.00 - 0.40 10*3/mm3    Basophils, Absolute 0.04 0.00 - 0.20 10*3/mm3    Immature Grans, Absolute 0.02 0.00 - 0.05 10*3/mm3    nRBC 0.0 0.0 - 0.2 /100 WBC   D-dimer, Quantitative    Collection Time: 10/20/24  7:23 PM    Specimen: Blood   Result Value Ref Range    D-Dimer, Quantitative 0.56 0.00 - 0.59 MCGFEU/mL   Protime-INR    Collection Time: 10/20/24  7:23 PM    Specimen: Blood   Result Value Ref Range    Protime 12.4 11.8 - 14.8 Seconds    INR 0.89 (L) 0.91 - 1.09   aPTT    Collection Time: 10/20/24  7:23 PM    Specimen: Blood   Result Value Ref Range    PTT 27.1 24.5 - 36.0 seconds   High Sensitivity Troponin T 2Hr    Collection Time: 10/20/24  9:03 PM    Specimen: Arm, Left; Blood   Result Value Ref Range    HS Troponin T 7 <22 ng/L    Troponin T Delta                Old charts were reviewed per [facility] EMR.   Pertinent details are summarized above.  All laboratory, radiologic, and EKG studies that were performed in the Emergency Department were a necessary part of the evaluation needed to exclude unstable or  emergent medical conditions.     Patient was hemodynamically and neurologically stable in the ED.   Pertinent studies were reviewed as above.     The patient received:  Medications   sodium chloride 0.9 % flush 10 mL (has no administration in time range)   aspirin chewable tablet 324 mg (324 mg Oral Given 10/20/24 1950)   iopamidol (ISOVUE-300) 61 % injection 100 mL (100 mL Intravenous Given 10/20/24 2114)            ED Disposition       ED Disposition   Discharge    Condition   Stable    Comment   --                 Dragon disclaimer:  Part of this note may be an electronic transcription/translation of spoken language to printed text using the Dragon Dictation System.    I have reviewed the patient’s prescription history via a prescription monitoring program.  This information is consistent with my knowledge of the patient’s controlled substance use history.    Patient evaluated during Coronavirus Pandemic. Isolation practices followed according to [facility] policy.     FINAL IMPRESSION   Diagnosis Plan   1. Other back pain, unspecified chronicity        2. Atypical chest pain              MD Elijah Chaudhari Jr, Thomas Mark Jr., MD  10/20/24 1993

## 2024-10-31 ENCOUNTER — OFFICE VISIT (OUTPATIENT)
Dept: PRIMARY CARE CLINIC | Age: 59
End: 2024-10-31
Payer: COMMERCIAL

## 2024-10-31 VITALS
OXYGEN SATURATION: 97 % | SYSTOLIC BLOOD PRESSURE: 118 MMHG | DIASTOLIC BLOOD PRESSURE: 76 MMHG | BODY MASS INDEX: 25.58 KG/M2 | WEIGHT: 173.2 LBS | TEMPERATURE: 97.6 F | HEART RATE: 72 BPM

## 2024-10-31 DIAGNOSIS — K21.00 GASTROESOPHAGEAL REFLUX DISEASE WITH ESOPHAGITIS WITHOUT HEMORRHAGE: ICD-10-CM

## 2024-10-31 DIAGNOSIS — R05.3 PERSISTENT COUGH FOR 3 WEEKS OR LONGER: ICD-10-CM

## 2024-10-31 DIAGNOSIS — R74.8 ELEVATED LIVER ENZYMES: Primary | ICD-10-CM

## 2024-10-31 LAB
ALBUMIN SERPL-MCNC: 4.9 G/DL (ref 3.5–5.2)
ALP SERPL-CCNC: 185 U/L (ref 40–129)
ALT SERPL-CCNC: 30 U/L (ref 5–41)
ANION GAP SERPL CALCULATED.3IONS-SCNC: 13 MMOL/L (ref 7–19)
AST SERPL-CCNC: 19 U/L (ref 5–40)
BILIRUB SERPL-MCNC: 1.4 MG/DL (ref 0.2–1.2)
BUN SERPL-MCNC: 15 MG/DL (ref 6–20)
CALCIUM SERPL-MCNC: 9.5 MG/DL (ref 8.6–10)
CHLORIDE SERPL-SCNC: 101 MMOL/L (ref 98–111)
CO2 SERPL-SCNC: 27 MMOL/L (ref 22–29)
CREAT SERPL-MCNC: 0.7 MG/DL (ref 0.7–1.2)
GLUCOSE SERPL-MCNC: 195 MG/DL (ref 70–99)
POTASSIUM SERPL-SCNC: 4.4 MMOL/L (ref 3.5–5)
PROT SERPL-MCNC: 7.5 G/DL (ref 6.4–8.3)
SODIUM SERPL-SCNC: 141 MMOL/L (ref 136–145)

## 2024-10-31 PROCEDURE — 99214 OFFICE O/P EST MOD 30 MIN: CPT | Performed by: NURSE PRACTITIONER

## 2024-10-31 PROCEDURE — 3078F DIAST BP <80 MM HG: CPT | Performed by: NURSE PRACTITIONER

## 2024-10-31 PROCEDURE — 3074F SYST BP LT 130 MM HG: CPT | Performed by: NURSE PRACTITIONER

## 2024-10-31 RX ORDER — SUCRALFATE 1 G/1
1 TABLET ORAL 4 TIMES DAILY
Qty: 40 TABLET | Refills: 0 | Status: SHIPPED | OUTPATIENT
Start: 2024-10-31 | End: 2024-11-10

## 2024-10-31 ASSESSMENT — ENCOUNTER SYMPTOMS
CHOKING: 0
TROUBLE SWALLOWING: 1
STRIDOR: 0
COUGH: 1
WHEEZING: 0
SHORTNESS OF BREATH: 0
CHEST TIGHTNESS: 0
APNEA: 0

## 2024-10-31 NOTE — PROGRESS NOTES
[facility]  [address] [place] [address]  Dept: [phone]  Dept Fax: [phone]  Loc: [phone]    Judah Vigil is a 59 y.o. male who presents today for his medical conditions/complaints as noted below.  Judah Vigil is c/o of Cough (For the last 6 months. C/o trouble clearing his throat and feels like he has drainage in his throat but can't cough anything up )        HPI:     HPI   Chief Complaint   Patient presents with    Cough     For the last 6 months. C/o trouble clearing his throat and feels like he has drainage in his throat but can't cough anything up    Is on omprazole most days, has persistant cough. He did go to ER 10 days ago and had ct chest and it was normal.  The day he went to the emergency room he was hurting in his mid upper back.  They did a CT chest which was normal and then they did a CT abdomen because his liver enzymes were elevated.  He is not sure why they were elevated he has not been drinking a lot of alcohol.  His wife notices the persistent cough.  He denies any chest pain.  He does not know if he has ever had an endoscopy before he has had a colonoscopy  Impression    1. No evidence of acute cardiopulmonary process.  2. Lungs are clear and well expanded.  3. Prominent coronary atheromatous calcification.  4. Thoracic aorta is unremarkable.  Past Medical History:   Diagnosis Date    CAD (coronary artery disease)     Chest pain     Diabetes mellitus (HCC)     GERD (gastroesophageal reflux disease)     History of atherosclerotic cardiovascular disease     Hyperlipidemia     Hypertension     Pacemaker     Syncope       Past Surgical History:   Procedure Laterality Date    COLONOSCOPY N/A 03/17/2023    Dr AN Rivera-Normal, 5 yr recall    CORONARY ANGIOPLASTY WITH STENT PLACEMENT      PACEMAKER PLACEMENT      [facility] COLONOSCOPY W/BIOPSY SINGLE/MULTIPLE N/A 12/04/2017    Dr AN Rivera-Diverticular disease-Tubular AP (-) dysplasia-5 yr recall

## 2024-10-31 NOTE — PATIENT INSTRUCTIONS
Repeat liver enzymes  Continue omeprazole daily  Add carafate to coat and may dissolve , coats  Avoid alcohol before bed  Refer to gi for endoscopy.

## 2024-11-04 RX ORDER — TICAGRELOR 60 MG/1
TABLET ORAL
Qty: 60 TABLET | Refills: 3 | Status: SHIPPED | OUTPATIENT
Start: 2024-11-04

## 2024-11-06 RX ORDER — SUCRALFATE 1 G/1
TABLET ORAL
Qty: 120 TABLET | Refills: 0 | Status: SHIPPED | OUTPATIENT
Start: 2024-11-06

## 2024-11-11 ENCOUNTER — OFFICE VISIT (OUTPATIENT)
Dept: GASTROENTEROLOGY | Age: 59
End: 2024-11-11
Payer: COMMERCIAL

## 2024-11-11 VITALS
OXYGEN SATURATION: 96 % | BODY MASS INDEX: 26.22 KG/M2 | HEIGHT: 68 IN | WEIGHT: 173 LBS | DIASTOLIC BLOOD PRESSURE: 76 MMHG | SYSTOLIC BLOOD PRESSURE: 126 MMHG | HEART RATE: 75 BPM

## 2024-11-11 DIAGNOSIS — R74.8 ELEVATED LIVER ENZYMES: ICD-10-CM

## 2024-11-11 DIAGNOSIS — R13.10 DYSPHAGIA, UNSPECIFIED TYPE: Primary | ICD-10-CM

## 2024-11-11 DIAGNOSIS — K21.9 GASTROESOPHAGEAL REFLUX DISEASE, UNSPECIFIED WHETHER ESOPHAGITIS PRESENT: ICD-10-CM

## 2024-11-11 LAB
AFP SERPL-MCNC: 3 NG/ML (ref 0–8.3)
ALBUMIN SERPL-MCNC: 5.1 G/DL (ref 3.5–5.2)
ALP SERPL-CCNC: 164 U/L (ref 40–129)
ALT SERPL-CCNC: 27 U/L (ref 5–41)
AST SERPL-CCNC: 16 U/L (ref 5–40)
BILIRUB DIRECT SERPL-MCNC: 0.4 MG/DL (ref 0–0.3)
BILIRUB INDIRECT SERPL-MCNC: 2 MG/DL (ref 0–1)
BILIRUB SERPL-MCNC: 2.4 MG/DL (ref 0.2–1.2)
GAMMA GLUTAMYL TRANSFERASE: 105 U/L (ref 8–61)
HAV IGM SERPL QL IA: NORMAL
HBV CORE IGM SERPL QL IA: NORMAL
HBV SURFACE AG SERPL QL IA: NORMAL
HCV AB SERPL QL IA: NORMAL
PROT SERPL-MCNC: 7.8 G/DL (ref 6.4–8.3)

## 2024-11-11 PROCEDURE — 99214 OFFICE O/P EST MOD 30 MIN: CPT

## 2024-11-11 PROCEDURE — 3078F DIAST BP <80 MM HG: CPT

## 2024-11-11 PROCEDURE — 3074F SYST BP LT 130 MM HG: CPT

## 2024-11-11 NOTE — PATIENT INSTRUCTIONS
The doctor will move the scope down your esophagus into your stomach. The doctor also may look at the duodenum.     If your doctor wants to take a sample of tissue for a biopsy, he or she may use small surgical tools, which are put into the scope, to cut off some tissue. You will not feel a biopsy, if one is taken. The doctor also can use the tools to stop bleeding or to do other treatments, if needed.     You will stay at the hospital or surgery center for 1 to 2 hours until the medicine you were given wears off.   What happens after an upper GI endoscopy?   After the test, you may belch and feel bloated for a while.     You may have a tickling, dry throat or mouth. You may feel a bit hoarse, and you may have a mild sore throat. These symptoms may last several days. Throat lozenges and warm saltwater gargles can help relieve the throat symptoms.     Ask your doctor when you can drive again.     Your doctor will tell you when you can go back to your usual diet and activities.     Don't drink alcohol for 12 to 24 hours after the test.   When should you call your doctor?   You have questions or concerns.     You don't understand how to prepare for your procedure.     You become ill before the procedure (such as fever, flu, or a cold).     You need to reschedule or have changed your mind about having the procedure.   Where can you learn more?  Go to https://www.[employer].net/patientEd and enter P790 to learn more about \"Upper GI Endoscopy: Before Your Procedure.\"  Current as of: October 19, 2023  Content Version: 14.2  © 2024 [employer].   Care instructions adapted under license by [employer]. If you have questions about a medical condition or this instruction, always ask your healthcare professional. [employer] disclaims any warranty or liability for your use of this information.       Patient Education        Gastroesophageal Reflux Disease (GERD): Care Instructions  Overview

## 2024-11-11 NOTE — PROGRESS NOTES
Subjective:     Patient ID: Judah Vigil is a 59 y.o. male  PCP: Massiel Neal APRN - CNP  Referring Provider: Massiel Neal APRN *    HPI  Patient presents to the office today with the following complaints: Gastroesophageal Reflux and Other (Elevated LFT, Persistent cough )    Patient seen in the office today. He states he is having acid reflux and a persistent cough. He has been having some dysphagia and hoarseness as well for about 6 months. He states he has been taking omeprazole 40 mg about every 3 days and this keeps his reflux in check. He also was started on carafate 1 gm 4 times a day about 2 days ago by his PCP. He states he has not noticed a difference but I instructed him it is meant to help heal any irritation and to keep on taking it. He states he has been losing weight without trying as well for the last 2 years. He has lost about 14 pounds total.  He has also had some constipation in the last 3 months. He states he drank some mag citrate to do a clean out and that helped. He denies bright red blood per rectum or black stools. He states his bowels move now about every 2 days.  Patient also states he was referred for elevated liver enzymes as well. Will repeat lab work and do a liver ultrasound and the follow up after his endoscopy and ultrasound.        CT Result (most recent):  CT ABDOMEN PELVIS W IV CONTRAST 01/07/2024    Narrative  EXAM:  CT SCAN OF THE ABDOMEN AND PELVIS WITH IVCONTRAST.    Date: 01/07/2024    Comparison: 07/31/2080    History: Epigastric pain with atypical back pain.    Technique:  A helical scan of the abdomen and pelvis was performed with IV contrast.    Findings:  Lung bases: Clear    Musculoskeletal: There is partial ankylosis of the sacroiliac joints.  There is a stable 0.5 cm sclerotic nodule in the left iliac wing and in the right superior pubic ramus.  No acute osseous abnormality.    Soft tissue: The spleen and liver have a uniform enhancement.  There is

## 2024-11-12 ASSESSMENT — ENCOUNTER SYMPTOMS
ABDOMINAL PAIN: 0
EYES NEGATIVE: 1
TROUBLE SWALLOWING: 1
RECTAL PAIN: 0
ABDOMINAL DISTENTION: 0
CONSTIPATION: 1
APNEA: 0
VOMITING: 0
VOICE CHANGE: 1
ALLERGIC/IMMUNOLOGIC NEGATIVE: 1
DIARRHEA: 0
NAUSEA: 0
COUGH: 1
ANAL BLEEDING: 0
BLOOD IN STOOL: 0

## 2024-11-19 ENCOUNTER — HOSPITAL ENCOUNTER (OUTPATIENT)
Dept: ULTRASOUND IMAGING | Age: 59
Discharge: HOME | End: 2024-11-12

## 2024-11-19 DIAGNOSIS — R74.8 ELEVATED LIVER ENZYMES: ICD-10-CM

## 2024-12-03 ENCOUNTER — TELEPHONE (OUTPATIENT)
Dept: CARDIOLOGY | Facility: CLINIC | Age: 59
End: 2024-12-03
Payer: COMMERCIAL

## 2024-12-03 ENCOUNTER — TELEPHONE (OUTPATIENT)
Dept: GASTROENTEROLOGY | Age: 59
End: 2024-12-03

## 2024-12-03 NOTE — TELEPHONE ENCOUNTER
Patient is scheduled for Endoscopy on 12/9/2024 and [facility] is requesting they hold their Brilinta for 5 days before procedure. Sorry to send it to you this way but I didn't get the request until last week and we had a short week. Please advise.

## 2024-12-03 NOTE — TELEPHONE ENCOUNTER
Patient: Judah Vigil    YOB: 1965      Clearance was received on December 3, 2024.    for Endoscopy / Colonoscopy scheduled for: EGD    Patient may discontinue the use of Brilinta  for 5  days prior to the procedure.    IS Lovenox required:  no    PATIENT NOTIFIED ON:  12.3.24    Called and LVM      Clearance scanned into media

## 2024-12-09 ENCOUNTER — ANESTHESIA EVENT (OUTPATIENT)
Dept: ENDOSCOPY | Age: 59
End: 2024-12-09
Payer: COMMERCIAL

## 2024-12-09 ENCOUNTER — ANESTHESIA (OUTPATIENT)
Dept: ENDOSCOPY | Age: 59
End: 2024-12-09
Payer: COMMERCIAL

## 2024-12-09 ENCOUNTER — HOSPITAL ENCOUNTER (OUTPATIENT)
Age: 59
Setting detail: OUTPATIENT SURGERY
Discharge: HOME | End: 2024-12-09
Attending: INTERNAL MEDICINE | Admitting: INTERNAL MEDICINE
Payer: COMMERCIAL

## 2024-12-09 ENCOUNTER — ANCILLARY PROCEDURE (OUTPATIENT)
Dept: ENDOSCOPY | Age: 59
End: 2024-12-09
Attending: INTERNAL MEDICINE
Payer: COMMERCIAL

## 2024-12-09 VITALS
DIASTOLIC BLOOD PRESSURE: 70 MMHG | RESPIRATION RATE: 18 BRPM | WEIGHT: 170 LBS | SYSTOLIC BLOOD PRESSURE: 110 MMHG | BODY MASS INDEX: 25.76 KG/M2 | HEART RATE: 61 BPM | TEMPERATURE: 96.9 F | HEIGHT: 68 IN | OXYGEN SATURATION: 99 %

## 2024-12-09 DIAGNOSIS — K21.9 GASTROESOPHAGEAL REFLUX DISEASE, UNSPECIFIED WHETHER ESOPHAGITIS PRESENT: ICD-10-CM

## 2024-12-09 DIAGNOSIS — R05.9 COUGH, UNSPECIFIED TYPE: ICD-10-CM

## 2024-12-09 DIAGNOSIS — R13.10 DYSPHAGIA, UNSPECIFIED TYPE: ICD-10-CM

## 2024-12-09 LAB
GLUCOSE BLD-MCNC: 172 MG/DL (ref 70–99)
PERFORMED ON: ABNORMAL

## 2024-12-09 PROCEDURE — 3609012400 HC EGD TRANSORAL BIOPSY SINGLE/MULTIPLE: Performed by: INTERNAL MEDICINE

## 2024-12-09 PROCEDURE — 82962 GLUCOSE BLOOD TEST: CPT

## 2024-12-09 PROCEDURE — 43450 DILATE ESOPHAGUS 1/MULT PASS: CPT | Performed by: INTERNAL MEDICINE

## 2024-12-09 PROCEDURE — 7100000010 HC PHASE II RECOVERY - FIRST 15 MIN: Performed by: INTERNAL MEDICINE

## 2024-12-09 PROCEDURE — 43239 EGD BIOPSY SINGLE/MULTIPLE: CPT | Performed by: INTERNAL MEDICINE

## 2024-12-09 PROCEDURE — 88305 TISSUE EXAM BY PATHOLOGIST: CPT

## 2024-12-09 PROCEDURE — 3609017700 HC EGD DILATION GASTRIC/DUODENAL STRICTURE: Performed by: INTERNAL MEDICINE

## 2024-12-09 PROCEDURE — 2709999900 HC NON-CHARGEABLE SUPPLY: Performed by: INTERNAL MEDICINE

## 2024-12-09 PROCEDURE — 3700000001 HC ADD 15 MINUTES (ANESTHESIA): Performed by: INTERNAL MEDICINE

## 2024-12-09 PROCEDURE — 7100000011 HC PHASE II RECOVERY - ADDTL 15 MIN: Performed by: INTERNAL MEDICINE

## 2024-12-09 PROCEDURE — 3700000000 HC ANESTHESIA ATTENDED CARE: Performed by: INTERNAL MEDICINE

## 2024-12-09 PROCEDURE — 6360000002 HC RX W HCPCS: Performed by: NURSE ANESTHETIST, CERTIFIED REGISTERED

## 2024-12-09 RX ORDER — SODIUM CHLORIDE 0.9 % (FLUSH) 0.9 %
5-40 SYRINGE (ML) INJECTION PRN
Status: DISCONTINUED | OUTPATIENT
Start: 2024-12-09 | End: 2024-12-09 | Stop reason: HOSPADM

## 2024-12-09 RX ORDER — PROPOFOL 10 MG/ML
INJECTION, EMULSION INTRAVENOUS
Status: DISCONTINUED | OUTPATIENT
Start: 2024-12-09 | End: 2024-12-09 | Stop reason: SDUPTHER

## 2024-12-09 RX ORDER — LIDOCAINE HYDROCHLORIDE 10 MG/ML
INJECTION, SOLUTION INFILTRATION; PERINEURAL
Status: DISCONTINUED | OUTPATIENT
Start: 2024-12-09 | End: 2024-12-09 | Stop reason: SDUPTHER

## 2024-12-09 RX ADMIN — PROPOFOL 230 MG: 10 INJECTION, EMULSION INTRAVENOUS at 09:58

## 2024-12-09 RX ADMIN — LIDOCAINE HYDROCHLORIDE 40 MG: 10 INJECTION, SOLUTION INFILTRATION; PERINEURAL at 09:58

## 2024-12-09 ASSESSMENT — PAIN - FUNCTIONAL ASSESSMENT
PAIN_FUNCTIONAL_ASSESSMENT: NONE - DENIES PAIN

## 2024-12-09 NOTE — H&P
Patient Name: Judah Vigil  : 1965  MRN: [id number]  DATE: 24    Allergies:   Allergies   Allergen Reactions    Semaglutide(0.25 Or 0.5mg-Dos)      pancreatitis        ENDOSCOPY  History and Physical    Procedure:    [] Diagnostic Colonoscopy       [] Screening Colonoscopy  Brief EGD      [] ERCP      [] EUS       [] Other    [x] Previous office notes/History and Physical reviewed from the patients chart. Please see EMR for further details of HPI. I have examined the patient's status immediately prior to the procedure and:      Indications/HPI:      1. Dysphagia, unspecified type    2. Gastroesophageal reflux disease, unspecified whether esophagitis present    3. Elevated liver enzymes      []Abdominal Pain   []Cancer- GI/Lung     []Fhx of colon CA/polyps  []History of Polyps  []Barretts            []Melena  []Abnormal Imaging              []Dysphagia              []Persistent Pneumonia   []Anemia                            []Food Impaction        []History of Polyps  [] GI Bleed             []Pulmonary nodule/Mass   []Change in bowel habits []Heartburn/Reflux  []Rectal Bleed (BRBPR)  []Chest Pain - Non Cardiac []Heme (+) Stool []Ulcers  []Constipation  []Hemoptysis  []Varices  []Diarrhea  []Hypoxemia    []Nausea/Vomiting   []Screening   []Crohns/Colitis  []Other:     Anesthesia:   [x] MAC [] Moderate Sedation   [] General   [] None     ROS: 12 pt Review of Symptoms was negative unless mentioned above    Medications:   Prior to Admission medications    Medication Sig Start Date End Date Taking? Authorizing Provider   zolpidem (AMBIEN) 10 MG tablet  24  Yes Provider, Dagmar, MD   sucralfate (CARAFATE) 1 GM tablet TAKE ONE (1) TABLET BY MOUTH 4 TIMES DAILY FOR 10 DAYS MAY DISSOLVE IN WATER IF DESIRED. 24   Massiel Neal APRN - CNP   BRILINTA 60 MG TABS tablet TAKE ONE (1) TABLET BY MOUTH 2 TIMES DAILY 24   Sarai Castillo APRN - NP   metFORMIN (GLUCOPHAGE) 500 MG tablet TAKE 2

## 2024-12-09 NOTE — OP NOTE
Endoscopic Procedure Note    Patient: Judah Vigil : 1965  Med Rec#: [id number] Acc#: [id number]     Primary Care Provider Massiel Neal APRN - CNP    Endoscopist: Lopez Abbott MD, MD    Date of Procedure:  2024    Procedure:   EGD with    A Stoll bougie dilation of esophagus and  Cold biopsies    Indications:   1. Dysphagia, unspecified type    2. Gastroesophageal reflux disease, unspecified whether esophagitis present    3. Elevated liver enzymes      Anesthesia:  Sedation was administered by anesthesia who monitored the patient during the procedure.    Estimated Blood Loss: minimal    Procedure:   After reviewing the patient's chart and obtaining informed consent, the patient was placed in the left lateral decubitus position.  A forward-viewing Olympus endoscope was lubricated and inserted through the mouth into the oropharynx. Under direct visualization, the upper esophagus was intubated. The scope was advanced to the level of the third portion of duodenum. Scope was slowly withdrawn with careful inspection of the mucosal surfaces. The scope was retroflexed for inspection of the gastric fundus and incisura. Findings and maneuvers are listed in impression below. The patient tolerated the procedure well. The scope was removed. There were no immediate complications.    Findings/IMPRESSION:  Esophagus: normal and a normal EG junction at 40 cm.    NO erosions or ulcers or nodules or strictures or webs or rings or mass lesions or extrinsic compression or diverticula noted. An empirical Stoll 54 fr bougie dilation was performed and random cold biopsies were taken to check for EoE and NERD.     There is no obvious hiatal hernia present.      Stomach:  normal.    NO ulcers or masses or gastric outlet obstruction or retained food or fluid. Rugae were normal and lumen distended well with insufflation. Retroflexed views otherwise revealed a normal GE junction, fundus and cardia as well.

## 2024-12-09 NOTE — DISCHARGE INSTRUCTIONS
1.  Await path results, the patient will be contacted in 7-10 days with biopsy results.  If the duodenal biopsies reveal any adenomas, repeat EGD in 2 to 3 months for their removal.  2.  Magic mouthwash 5 ml PO Swish and swallow q3h PRN ONLY IF patient has post-procedural sorethroat or chest pain.  3. Full liquids to soft diet today upon discharge from the surgicenter; may advance diet starting in AM tomorrow.  4. May resume other meds except any ASA/NSAIDs; may use cough drops or lozenges PRN; also continue meds for GERD with anti-GERD measures.  5. NO ASA/NSAIDs x 2 weeks  6. OP f/u in 6-8 weeks with Ms. Peterson; will consider an Esophageal manometry later if the patient's dysphagia persists.     Upper GI Endoscopy: What to Expect at Home  Your Recovery  You had an upper GI endoscopy. Your doctor used a thin, lighted tube that bends to look at the inside of your esophagus, your stomach, and the first part of the small intestine, called the duodenum.    How can you care for yourself at home?  Activity   Rest as much as you need to after you go home.  You should be able to go back to your usual activities the day after the test.  Due to anesthesia, no driving or operating equipment for 24 hours.  Diet   Follow your doctor's directions for eating after the test.  Drink plenty of fluids (unless your doctor has told you not to).  Medications   If you have a sore throat the day after the test, use an over-the-counter spray to numb your throat.  When should you call for help?   Call 911 anytime you think you may need emergency care. For example, call if:    You passed out (lost consciousness).     You have trouble breathing.     You pass maroon or bloody stools.   Call your doctor now or seek immediate medical care if:    You have pain that does not get better after your take pain medicine.     You have new or worse belly pain.     You have blood in your stools.     You are sick to your stomach and cannot keep fluids

## 2024-12-09 NOTE — ANESTHESIA PRE PROCEDURE
Department of Anesthesiology  Preprocedure Note       Name:  Judah Vigil   Age:  59 y.o.  :  1965                                          MRN:  [id number]         Date:  2024      Surgeon: Surgeon(s):  Lopez Abbott MD    Procedure: Procedure(s):  ESOPHAGOGASTRODUODENOSCOPY BIOPSY    Medications prior to admission:   Prior to Admission medications    Medication Sig Start Date End Date Taking? Authorizing Provider   zolpidem (AMBIEN) 10 MG tablet  24  Yes Dagmar Mtz MD   sucralfate (CARAFATE) 1 GM tablet TAKE ONE (1) TABLET BY MOUTH 4 TIMES DAILY FOR 10 DAYS MAY DISSOLVE IN WATER IF DESIRED. 24   Massiel Neal APRN - CNP   BRILINTA 60 MG TABS tablet TAKE ONE (1) TABLET BY MOUTH 2 TIMES DAILY 24   Sarai Castillo APRN - NP   metFORMIN (GLUCOPHAGE) 500 MG tablet TAKE 2 TABLETS BY MOUTH IN THE MORNING AND AT BEDTIME 10/16/24   Massiel Neal APRN - CNP   FARXIGA 10 MG tablet Take 1 tablet by mouth every morning 8/15/24   Massiel Neal APRN - CNP   atorvastatin (LIPITOR) 80 MG tablet Take 1 tablet by mouth daily 23   Dagmar Mtz MD   omeprazole (PRILOSEC) 40 MG delayed release capsule Take 1 capsule by mouth daily    Dagmar Mtz MD   metoprolol (LOPRESSOR) 25 MG tablet  3/2/16   Dagmar Mtz MD   ASPIRIN PO Take 81 mg by mouth daily     Dagmar Mtz MD       Current medications:    Current Facility-Administered Medications   Medication Dose Route Frequency Provider Last Rate Last Admin   • sodium chloride flush 0.9 % injection 5-40 mL  5-40 mL IntraVENous PRN Lopez Abbott MD           Allergies:    Allergies   Allergen Reactions   • Semaglutide(0.25 Or 0.5mg-Dos)      pancreatitis       Problem List:    Patient Active Problem List   Diagnosis Code   • CAD (coronary artery disease) I25.10   • Chest pain R07.9   • Pacemaker Z95.0   • Hyperlipidemia E78.5   • Syncope R55   • GERD (gastroesophageal reflux

## 2024-12-09 NOTE — ANESTHESIA POSTPROCEDURE EVALUATION
Department of Anesthesiology  Postprocedure Note    Patient: Judah Vigil  MRN: [id number]  YOB: 1965  Date of evaluation: 12/9/2024    Procedure Summary       Date: 12/09/24 Room / Location: [place] [id number] / [facility]    Anesthesia Start: 0953 Anesthesia Stop:     Procedures:       ESOPHAGOGASTRODUODENOSCOPY BIOPSY (Abdomen)      ESOPHAGOGASTRODUODENOSCOPY DILATATION Diagnosis:       Dysphagia, unspecified type      Cough, unspecified type      Gastroesophageal reflux disease, unspecified whether esophagitis present      (Dysphagia, unspecified type [R13.10])      (Cough, unspecified type [R05.9])      (Gastroesophageal reflux disease, unspecified whether esophagitis present [K21.9])    Surgeons: Lopez Abbott MD Responsible Provider: Gloria Deng APRN - CRNA    Anesthesia Type: general, TIVA ASA Status: 3            Anesthesia Type: No value filed.    Monique Phase I: Monique Score: 10    Monique Phase II:      Anesthesia Post Evaluation    Patient location during evaluation: bedside  Patient participation: complete - patient participated  Level of consciousness: sleepy but conscious  Pain score: 0  Airway patency: patent  Nausea & Vomiting: no nausea and no vomiting  Cardiovascular status: hemodynamically stable and blood pressure returned to baseline  Respiratory status: acceptable and nasal cannula  Hydration status: stable  Pain management: adequate    No notable events documented.

## 2024-12-16 DIAGNOSIS — K76.0 HEPATIC STEATOSIS: Primary | ICD-10-CM

## 2025-01-02 ENCOUNTER — TELEPHONE (OUTPATIENT)
Dept: GASTROENTEROLOGY | Age: 60
End: 2025-01-02

## 2025-01-02 NOTE — TELEPHONE ENCOUNTER
01-02-25  Called pt and LVM that I have sched his Elastography for 1-9-25 @ 9am with arrival time of 8:45 @ [facility] [address].    Looks like it as sched on 12/30/24 but pt did not make it to that appt,

## 2025-01-09 ENCOUNTER — HOSPITAL ENCOUNTER (OUTPATIENT)
Dept: ULTRASOUND IMAGING | Age: 60
Discharge: HOME | End: 2025-01-09
Payer: COMMERCIAL

## 2025-01-09 DIAGNOSIS — K76.0 HEPATIC STEATOSIS: ICD-10-CM

## 2025-01-09 PROCEDURE — 76981 USE PARENCHYMA: CPT

## 2025-01-16 ENCOUNTER — TELEPHONE (OUTPATIENT)
Dept: GASTROENTEROLOGY | Age: 60
End: 2025-01-16

## 2025-01-16 NOTE — TELEPHONE ENCOUNTER
1.16.25     Thanks Andre,  pt has been notified of results and recommendations. He will be by the lab dept today.

## 2025-01-16 NOTE — TELEPHONE ENCOUNTER
----- Message from JA Geller CNP sent at 1/16/2025  9:50 AM CST -----  Liver elastography shows no scarring of the liver at this time. I would like to repeat your liver enzymes (blood work) prior to your next visit on 1/20/2025.

## 2025-01-17 DIAGNOSIS — K76.0 HEPATIC STEATOSIS: ICD-10-CM

## 2025-01-17 LAB
ALBUMIN SERPL-MCNC: 4.9 G/DL (ref 3.5–5.2)
ALP SERPL-CCNC: 161 U/L (ref 40–129)
ALT SERPL-CCNC: 30 U/L (ref 5–41)
AST SERPL-CCNC: 20 U/L (ref 5–40)
BILIRUB DIRECT SERPL-MCNC: 0.3 MG/DL (ref 0–0.3)
BILIRUB INDIRECT SERPL-MCNC: 2.1 MG/DL (ref 0–1)
BILIRUB SERPL-MCNC: 2.4 MG/DL (ref 0.2–1.2)
PROT SERPL-MCNC: 7.6 G/DL (ref 6.4–8.3)

## 2025-01-20 ENCOUNTER — OFFICE VISIT (OUTPATIENT)
Dept: GASTROENTEROLOGY | Age: 60
End: 2025-01-20
Payer: COMMERCIAL

## 2025-01-20 VITALS
DIASTOLIC BLOOD PRESSURE: 82 MMHG | OXYGEN SATURATION: 97 % | WEIGHT: 177 LBS | BODY MASS INDEX: 26.83 KG/M2 | HEART RATE: 83 BPM | SYSTOLIC BLOOD PRESSURE: 138 MMHG | HEIGHT: 68 IN

## 2025-01-20 DIAGNOSIS — K76.0 FATTY LIVER: ICD-10-CM

## 2025-01-20 DIAGNOSIS — K21.9 GASTROESOPHAGEAL REFLUX DISEASE WITHOUT ESOPHAGITIS: ICD-10-CM

## 2025-01-20 DIAGNOSIS — R13.19 ESOPHAGEAL DYSPHAGIA: ICD-10-CM

## 2025-01-20 DIAGNOSIS — R74.8 ELEVATED LIVER ENZYMES: ICD-10-CM

## 2025-01-20 DIAGNOSIS — K29.80 ACUTE DUODENITIS: Primary | ICD-10-CM

## 2025-01-20 PROCEDURE — 3079F DIAST BP 80-89 MM HG: CPT

## 2025-01-20 PROCEDURE — 3075F SYST BP GE 130 - 139MM HG: CPT

## 2025-01-20 PROCEDURE — 99214 OFFICE O/P EST MOD 30 MIN: CPT

## 2025-01-20 ASSESSMENT — ENCOUNTER SYMPTOMS
ABDOMINAL PAIN: 0
RESPIRATORY NEGATIVE: 1
COUGH: 0
ALLERGIC/IMMUNOLOGIC NEGATIVE: 1
EYES NEGATIVE: 1
TROUBLE SWALLOWING: 0
NAUSEA: 0
GASTROINTESTINAL NEGATIVE: 1
CHOKING: 0
VOMITING: 0

## 2025-01-20 NOTE — PROGRESS NOTES
Cardiovascular:      Rate and Rhythm: Normal rate and regular rhythm.      Pulses: Normal pulses.      Heart sounds: Normal heart sounds.   Pulmonary:      Effort: Pulmonary effort is normal. No respiratory distress.      Breath sounds: Normal breath sounds.   Abdominal:      General: Bowel sounds are normal. There is no distension.      Palpations: Abdomen is soft.      Tenderness: There is no abdominal tenderness.   Musculoskeletal:         General: Normal range of motion.      Cervical back: Normal range of motion.   Skin:     General: Skin is warm and dry.   Neurological:      General: No focal deficit present.      Mental Status: He is alert and oriented to person, place, and time.   Psychiatric:         Mood and Affect: Mood normal.         Behavior: Behavior normal.

## 2025-01-20 NOTE — PATIENT INSTRUCTIONS
label. If you use these medicines often, talk with your doctor.  Stay at a weight that's healthy for you. Extra weight puts a lot of pressure on the valve between the stomach and esophagus. Losing even a few pounds can help. Talk to your doctor if you need help losing weight.  Change your eating habits.  Try to eat several small meals instead of two or three large meals.  After you eat, wait 2 to 3 hours before you lie down. Snacking close to bedtime can make your symptoms worse.  Avoid foods that make your symptoms worse. These may include chocolate, mint, alcohol, pepper, spicy foods, high-fat foods, or drinks with caffeine in them, such as tea, coffee, leonel, or energy drinks. If your symptoms are worse after you eat a certain food, you may want to stop eating it to see if your symptoms get better.  Try to quit smoking or chewing tobacco, or cut back as much as you can. If you need help quitting, talk to your doctor about quit-tobacco programs and medicines. These can increase your chances of quitting for good.  If you have GERD symptoms while trying to sleep, raise the head of your bed 6 to 8 inches by putting the frame on blocks or placing a foam wedge under the head of your mattress. (Adding extra pillows does not work.)  Do not wear tight clothing around your middle.  When should you call for help?   Call 911  anytime you think you may need emergency care. For example, call if:    You passed out (lost consciousness).   Call your doctor now or seek immediate medical care if:    You have new or worse belly pain.     Your stools are black and tarlike or have streaks of blood.     You vomit blood.   Watch closely for changes in your health, and be sure to contact your doctor if:    Your symptoms have not improved after 2 weeks.     Food seems to catch in your throat or chest.   Where can you learn more?  Go to https://www.healthwise.net/patientEd and enter T927 to learn more about \"Gastroesophageal Reflux Disease

## 2025-02-06 ENCOUNTER — PATIENT MESSAGE (OUTPATIENT)
Dept: PRIMARY CARE CLINIC | Age: 60
End: 2025-02-06

## 2025-02-06 NOTE — TELEPHONE ENCOUNTER
[employer] message sent to patient for him to call the office to reschedule appointment with another provider d/t Massiel will no longer be here.

## 2025-02-07 PROCEDURE — 93294 REM INTERROG EVL PM/LDLS PM: CPT | Performed by: INTERNAL MEDICINE

## 2025-02-07 PROCEDURE — 93296 REM INTERROG EVL PM/IDS: CPT | Performed by: INTERNAL MEDICINE

## 2025-06-30 DIAGNOSIS — K21.9 GASTROESOPHAGEAL REFLUX DISEASE WITHOUT ESOPHAGITIS: ICD-10-CM

## 2025-06-30 DIAGNOSIS — K76.0 FATTY LIVER: ICD-10-CM

## 2025-06-30 DIAGNOSIS — K29.80 ACUTE DUODENITIS: Primary | ICD-10-CM

## 2025-07-22 LAB
MC_CV_MDC_IDC_RATE_1: 150
MC_CV_MDC_IDC_RATE_1: 171
MC_CV_MDC_IDC_THERAPIES: NORMAL
MC_CV_MDC_IDC_ZONE_ID: 2
MC_CV_MDC_IDC_ZONE_ID: 6
MDC_IDC_MSMT_BATTERY_REMAINING_LONGEVITY: 163 MO
MDC_IDC_MSMT_BATTERY_RRT_TRIGGER: 2.62
MDC_IDC_MSMT_BATTERY_STATUS: NORMAL
MDC_IDC_MSMT_BATTERY_VOLTAGE: 3.04
MDC_IDC_MSMT_LEADCHNL_RA_DTM: NORMAL
MDC_IDC_MSMT_LEADCHNL_RA_IMPEDANCE_VALUE: 608
MDC_IDC_MSMT_LEADCHNL_RA_PACING_THRESHOLD_AMPLITUDE: 0.75
MDC_IDC_MSMT_LEADCHNL_RA_PACING_THRESHOLD_POLARITY: NORMAL
MDC_IDC_MSMT_LEADCHNL_RA_PACING_THRESHOLD_PULSEWIDTH: 0.4
MDC_IDC_MSMT_LEADCHNL_RA_SENSING_INTR_AMPL: 2.12
MDC_IDC_MSMT_LEADCHNL_RV_DTM: NORMAL
MDC_IDC_MSMT_LEADCHNL_RV_IMPEDANCE_VALUE: 418
MDC_IDC_MSMT_LEADCHNL_RV_PACING_THRESHOLD_AMPLITUDE: 1
MDC_IDC_MSMT_LEADCHNL_RV_PACING_THRESHOLD_POLARITY: NORMAL
MDC_IDC_MSMT_LEADCHNL_RV_PACING_THRESHOLD_PULSEWIDTH: 0.4
MDC_IDC_MSMT_LEADCHNL_RV_SENSING_INTR_AMPL: 4.12
MDC_IDC_PG_IMPLANT_DTM: NORMAL
MDC_IDC_PG_MFG: NORMAL
MDC_IDC_PG_MODEL: NORMAL
MDC_IDC_PG_SERIAL: NORMAL
MDC_IDC_PG_TYPE: NORMAL
MDC_IDC_SESS_DTM: NORMAL
MDC_IDC_SESS_TYPE: NORMAL
MDC_IDC_SET_BRADY_AT_MODE_SWITCH_RATE: 171
MDC_IDC_SET_BRADY_LOWRATE: 50
MDC_IDC_SET_BRADY_MAX_SENSOR_RATE: 130
MDC_IDC_SET_BRADY_MAX_TRACKING_RATE: 130
MDC_IDC_SET_BRADY_MODE: NORMAL
MDC_IDC_SET_BRADY_PAV_DELAY: 180
MDC_IDC_SET_BRADY_SAV_DELAY: 150
MDC_IDC_SET_LEADCHNL_RA_PACING_AMPLITUDE: 1.5
MDC_IDC_SET_LEADCHNL_RA_PACING_POLARITY: NORMAL
MDC_IDC_SET_LEADCHNL_RA_PACING_PULSEWIDTH: 0.4
MDC_IDC_SET_LEADCHNL_RA_SENSING_POLARITY: NORMAL
MDC_IDC_SET_LEADCHNL_RA_SENSING_SENSITIVITY: 0.3
MDC_IDC_SET_LEADCHNL_RV_PACING_AMPLITUDE: 2
MDC_IDC_SET_LEADCHNL_RV_PACING_POLARITY: NORMAL
MDC_IDC_SET_LEADCHNL_RV_PACING_PULSEWIDTH: 0.4
MDC_IDC_SET_LEADCHNL_RV_SENSING_POLARITY: NORMAL
MDC_IDC_SET_LEADCHNL_RV_SENSING_SENSITIVITY: 0.9
MDC_IDC_SET_ZONE_STATUS: NORMAL
MDC_IDC_SET_ZONE_STATUS: NORMAL
MDC_IDC_SET_ZONE_TYPE: NORMAL
MDC_IDC_SET_ZONE_TYPE: NORMAL
MDC_IDC_STAT_AT_BURDEN_PERCENT: 0
MDC_IDC_STAT_BRADY_RA_PERCENT_PACED: 9.42
MDC_IDC_STAT_BRADY_RV_PERCENT_PACED: 0.04

## (undated) DEVICE — PK PM 30

## (undated) DEVICE — ENDO KIT,LOURDES HOSPITAL: Brand: MEDLINE INDUSTRIES, INC.

## (undated) DEVICE — PAD, DEFIB, ADULT, RADIOTRANS, PHYSIO: Brand: MEDLINE

## (undated) DEVICE — SOL NS 500ML

## (undated) DEVICE — FORCEPS BX L240CM DIA2.4MM L NDL RAD JAW 4 133340

## (undated) DEVICE — CANN NASL ETCO2 LO/FLO A/

## (undated) DEVICE — SOL IRR NACL 0.9PCT BT 1000ML

## (undated) DEVICE — ADHS SKIN PREMIERPRO EXOFIN TOPICAL HI/VISC .5ML

## (undated) DEVICE — FORCEPS BX L240CM JAW DIA2.8MM L CAP W/ NDL MIC MESH TOOTH

## (undated) DEVICE — STRIP,CLOSURE,WOUND,MEDI-STRIP,1/2X4: Brand: MEDLINE